# Patient Record
Sex: MALE | Race: WHITE | NOT HISPANIC OR LATINO | Employment: FULL TIME | URBAN - METROPOLITAN AREA
[De-identification: names, ages, dates, MRNs, and addresses within clinical notes are randomized per-mention and may not be internally consistent; named-entity substitution may affect disease eponyms.]

---

## 2018-08-10 ENCOUNTER — HOSPITAL ENCOUNTER (EMERGENCY)
Facility: HOSPITAL | Age: 23
Discharge: HOME | End: 2018-08-10
Attending: EMERGENCY MEDICINE
Payer: COMMERCIAL

## 2018-08-10 VITALS
SYSTOLIC BLOOD PRESSURE: 150 MMHG | HEART RATE: 109 BPM | WEIGHT: 225.06 LBS | RESPIRATION RATE: 18 BRPM | BODY MASS INDEX: 32.22 KG/M2 | DIASTOLIC BLOOD PRESSURE: 82 MMHG | TEMPERATURE: 97.3 F | HEIGHT: 70 IN | OXYGEN SATURATION: 98 %

## 2018-08-10 DIAGNOSIS — I10 HYPERTENSION: Primary | ICD-10-CM

## 2018-08-10 DIAGNOSIS — K21.9 GERD (GASTROESOPHAGEAL REFLUX DISEASE): ICD-10-CM

## 2018-08-10 DIAGNOSIS — Z76.0 ENCOUNTER FOR MEDICATION REFILL: ICD-10-CM

## 2018-08-10 PROCEDURE — 99281 EMR DPT VST MAYX REQ PHY/QHP: CPT

## 2018-08-10 RX ORDER — QUETIAPINE FUMARATE 25 MG/1
25 TABLET, FILM COATED ORAL 3 TIMES DAILY
Qty: 84 TABLET | Refills: 0 | Status: SHIPPED | OUTPATIENT
Start: 2018-08-10 | End: 2018-08-10

## 2018-08-10 RX ORDER — PANTOPRAZOLE SODIUM 40 MG/1
40 TABLET, DELAYED RELEASE ORAL DAILY
COMMUNITY
End: 2018-08-10

## 2018-08-10 RX ORDER — LOSARTAN POTASSIUM 50 MG/1
25 TABLET ORAL DAILY
COMMUNITY
End: 2018-08-10

## 2018-08-10 RX ORDER — QUETIAPINE FUMARATE 200 MG/1
25 TABLET, FILM COATED ORAL 3 TIMES DAILY
COMMUNITY
End: 2018-08-10

## 2018-08-10 RX ORDER — AMITRIPTYLINE HYDROCHLORIDE 75 MG/1
100 TABLET, FILM COATED ORAL 3 TIMES DAILY
COMMUNITY
End: 2018-08-10

## 2018-08-10 RX ORDER — GABAPENTIN 800 MG/1
800 TABLET ORAL 4 TIMES DAILY
Qty: 112 TABLET | Refills: 0 | Status: SHIPPED | OUTPATIENT
Start: 2018-08-10 | End: 2018-09-07

## 2018-08-10 RX ORDER — GABAPENTIN 800 MG/1
800 TABLET ORAL 4 TIMES DAILY
COMMUNITY
End: 2018-08-10

## 2018-08-10 RX ORDER — QUETIAPINE FUMARATE 400 MG/1
400 TABLET, FILM COATED ORAL
Qty: 28 TABLET | Refills: 0 | Status: SHIPPED | OUTPATIENT
Start: 2018-08-10 | End: 2018-09-07

## 2018-08-10 RX ORDER — PANTOPRAZOLE SODIUM 40 MG/1
40 TABLET, DELAYED RELEASE ORAL DAILY
Qty: 28 TABLET | Refills: 0 | Status: SHIPPED | OUTPATIENT
Start: 2018-08-10 | End: 2018-09-07

## 2018-08-10 RX ORDER — SUCRALFATE 1 G/1
1 TABLET ORAL 4 TIMES DAILY
COMMUNITY
End: 2018-08-10

## 2018-08-10 RX ORDER — NICOTINE 21 MG/24HR
1 PATCH, TRANSDERMAL 24 HOURS TRANSDERMAL EVERY 24 HOURS
Qty: 28 PATCH | Refills: 0 | Status: SHIPPED | OUTPATIENT
Start: 2018-08-10

## 2018-08-10 RX ORDER — SERTRALINE HYDROCHLORIDE 100 MG/1
100 TABLET, FILM COATED ORAL DAILY
COMMUNITY
End: 2018-08-10

## 2018-08-10 RX ORDER — SUCRALFATE 1 G/1
1 TABLET ORAL 4 TIMES DAILY
Qty: 112 TABLET | Refills: 0 | Status: SHIPPED | OUTPATIENT
Start: 2018-08-10 | End: 2018-09-07

## 2018-08-10 RX ORDER — PRAZOSIN HYDROCHLORIDE 1 MG/1
1 CAPSULE ORAL
COMMUNITY
End: 2018-08-10

## 2018-08-10 RX ORDER — QUETIAPINE FUMARATE 400 MG/1
400 TABLET, FILM COATED ORAL
COMMUNITY
End: 2018-08-10

## 2018-08-10 RX ORDER — PRAZOSIN HYDROCHLORIDE 1 MG/1
1 CAPSULE ORAL
Qty: 28 CAPSULE | Refills: 0 | Status: SHIPPED | OUTPATIENT
Start: 2018-08-10 | End: 2018-09-07

## 2018-08-10 RX ORDER — LOSARTAN POTASSIUM 25 MG/1
25 TABLET ORAL DAILY
Qty: 28 TABLET | Refills: 0 | Status: SHIPPED | OUTPATIENT
Start: 2018-08-10 | End: 2018-09-07

## 2018-08-10 RX ORDER — QUETIAPINE FUMARATE 200 MG/1
200 TABLET, FILM COATED ORAL 3 TIMES DAILY
Qty: 84 TABLET | Refills: 0 | Status: SHIPPED | OUTPATIENT
Start: 2018-08-10 | End: 2018-09-07

## 2018-08-10 RX ORDER — AMITRIPTYLINE HYDROCHLORIDE 100 MG/1
100 TABLET, FILM COATED ORAL 3 TIMES DAILY
Qty: 84 TABLET | Refills: 0 | Status: SHIPPED | OUTPATIENT
Start: 2018-08-10 | End: 2018-09-26

## 2018-08-10 RX ORDER — SERTRALINE HYDROCHLORIDE 100 MG/1
100 TABLET, FILM COATED ORAL DAILY
Qty: 28 TABLET | Refills: 0 | Status: SHIPPED | OUTPATIENT
Start: 2018-08-10 | End: 2018-09-07

## 2018-08-10 RX ORDER — NICOTINE 21 MG/24HR
1 PATCH, TRANSDERMAL 24 HOURS TRANSDERMAL EVERY 24 HOURS
COMMUNITY
End: 2018-08-10

## 2018-08-10 NOTE — DISCHARGE INSTRUCTIONS
Gastroesophageal Reflux Disease   WHAT YOU NEED TO KNOW:   Gastroesophageal reflux occurs when acid and food in the stomach back up into the esophagus  Gastroesophageal reflux disease (GERD) is reflux that occurs more than twice a week for a few weeks  It usually causes heartburn and other symptoms  GERD can cause other health problems over time if it is not treated  DISCHARGE INSTRUCTIONS:   Return to the emergency department if:   · You feel full and cannot burp or vomit  · You have severe chest pain and sudden trouble breathing  · Your bowel movements are black, bloody, or tarry-looking  · Your vomit looks like coffee grounds or has blood in it  Contact your healthcare provider if:   · You vomit large amounts, or you vomit often  · You have trouble breathing after you vomit  · You have trouble swallowing, or pain with swallowing  · You are losing weight without trying  · Your symptoms get worse or do not improve with treatment  · You have questions or concerns about your condition or care  Medicines:   · Medicines  are used to decrease stomach acid  Medicine may also be used to help your lower esophageal sphincter and stomach contract (tighten) more  · Take your medicine as directed  Contact your healthcare provider if you think your medicine is not helping or if you have side effects  Tell him of her if you are allergic to any medicine  Keep a list of the medicines, vitamins, and herbs you take  Include the amounts, and when and why you take them  Bring the list or the pill bottles to follow-up visits  Carry your medicine list with you in case of an emergency  Manage GERD:   · Do not have foods or drinks that may increase heartburn  These include chocolate, peppermint, fried or fatty foods, drinks that contain caffeine, or carbonated drinks (soda)  Other foods include spicy foods, onions, tomatoes, and tomato-based foods   Do not have foods or drinks that can irritate your esophagus, such as citrus fruits, juices, and alcohol  · Do not eat large meals  When you eat a lot of food at one time, your stomach needs more acid to digest it  Eat 6 small meals each day instead of 3 large ones, and eat slowly  Do not eat meals 2 to 3 hours before bedtime  · Elevate the head of your bed  Place 6-inch blocks under the head of your bed frame  You may also use more than one pillow under your head and shoulders while you sleep  · Maintain a healthy weight  If you are overweight, weight loss may help relieve symptoms of GERD  · Do not smoke  Smoking weakens the lower esophageal sphincter and increases the risk of GERD  Ask your healthcare provider for information if you currently smoke and need help to quit  E-cigarettes or smokeless tobacco still contain nicotine  Talk to your healthcare provider before you use these products  · Do not wear clothing that is tight around your waist   Tight clothing can put pressure on your stomach and cause or worsen GERD symptoms  Follow up with your healthcare provider as directed:  Write down your questions so you remember to ask them during your visits  © 2017 [facility] [facility] Information is for End User's use only and may not be sold, redistributed or otherwise used for commercial purposes  All illustrations and images included in CareNotes® are the copyrighted property of [employer]  or Freddie Riley  The above information is an  only  It is not intended as medical advice for individual conditions or treatments  Talk to your doctor, nurse or pharmacist before following any medical regimen to see if it is safe and effective for you

## 2018-08-10 NOTE — ED PROVIDER NOTES
History  Chief Complaint   Patient presents with    Medication Refill     Patient reports that he is a [place] resident with insurance, recently released from [facility]  states that "no one takes my insurance and I need prescriptions for my medicine " patient states that he has approximately 3 days of meds left  24 yo male w/ hx of opiate and EtOH abuse in remission, HTN, GERD and psychiatric disorder presents to the Emergency Department requesting a medication refill  He is new to the area and states that his current insurance coverage does not cover a local PMD; he will change insurance on 9/1/18 and has a pending appointment already set up through his   He has no acute complaints  Still has three days of medication remaining  No dosage changes within past 6 months  Prior to Admission Medications   Prescriptions Last Dose Informant Patient Reported? Taking?    QUEtiapine (SEROquel) 200 mg tablet   Yes Yes   Sig: Take 25 mg by mouth 3 (three) times a day   QUEtiapine (SEROquel) 400 MG tablet   Yes Yes   Sig: Take 400 mg by mouth daily at bedtime   amitriptyline (ELAVIL) 75 mg tablet   Yes Yes   Sig: Take 100 mg by mouth 3 (three) times a day   gabapentin (NEURONTIN) 800 mg tablet   Yes Yes   Sig: Take 800 mg by mouth 4 (four) times a day   losartan (COZAAR) 50 mg tablet   Yes Yes   Sig: Take 25 mg by mouth daily   nicotine (NICODERM CQ) 14 mg/24hr TD 24 hr patch   Yes Yes   Sig: Place 1 patch on the skin every 24 hours   pantoprazole (PROTONIX) 40 mg tablet   Yes Yes   Sig: Take 40 mg by mouth daily   prazosin (MINIPRESS) 1 mg capsule   Yes Yes   Sig: Take 1 mg by mouth daily at bedtime   sertraline (ZOLOFT) 100 mg tablet   Yes Yes   Sig: Take 100 mg by mouth daily   sertraline (ZOLOFT) 50 mg tablet   Yes Yes   Sig: Take 50 mg by mouth daily   sucralfate (CARAFATE) 1 g tablet   Yes Yes   Sig: Take 1 g by mouth 4 (four) times a day      Facility-Administered Medications: None Past Medical History:   Diagnosis Date    Gastric ulcer     GERD (gastroesophageal reflux disease)     Hypertension     Psychiatric disorder        Past Surgical History:   Procedure Laterality Date    KNEE SURGERY      SHOULDER SURGERY         History reviewed  No pertinent family history  I have reviewed and agree with the history as documented  Social History   Substance Use Topics    Smoking status: Current Every Day Smoker     Packs/day: 0 50    Smokeless tobacco: Never Used    Alcohol use Yes      Comment: in recovery         Review of Systems   Constitutional: Negative for fever  Respiratory: Negative for shortness of breath  Cardiovascular: Negative for chest pain  Gastrointestinal: Negative for abdominal pain and vomiting  Allergic/Immunologic: Negative for immunocompromised state  Psychiatric/Behavioral: Negative for behavioral problems, confusion, hallucinations, self-injury, sleep disturbance and suicidal ideas  The patient is not nervous/anxious  Physical Exam  Physical Exam   Constitutional: He is oriented to person, place, and time  He appears well-developed and well-nourished  No distress  HENT:   Head: Normocephalic and atraumatic  Eyes: Pupils are equal, round, and reactive to light  No scleral icterus  Neck: No JVD present  Cardiovascular: Normal rate and regular rhythm  Exam reveals no gallop and no friction rub  No murmur heard  Pulmonary/Chest: No respiratory distress  He has no wheezes  He has no rales  Neurological: He is alert and oriented to person, place, and time  Skin: Skin is warm and dry  Capillary refill takes less than 2 seconds  He is not diaphoretic  Psychiatric: He has a normal mood and affect  His behavior is normal  Thought content normal    Vitals reviewed        Vital Signs  ED Triage Vitals [08/10/18 1127]   Temperature Pulse Respirations Blood Pressure SpO2   (!) 97 3 °F (36 3 °C) (!) 109 18 150/82 98 %      Temp Source Heart Rate Source Patient Position - Orthostatic VS BP Location FiO2 (%)   Temporal Monitor Sitting Right arm --      Pain Score       No Pain           Vitals:    08/10/18 1127   BP: 150/82   Pulse: (!) 109   Patient Position - Orthostatic VS: Sitting       Visual Acuity      ED Medications  Medications - No data to display    Diagnostic Studies  Results Reviewed     None                 No orders to display              Procedures  Procedures       Phone Contacts  ED Phone Contact    ED Course                               MDM  Number of Diagnoses or Management Options  Encounter for medication refill: minor  GERD (gastroesophageal reflux disease): Hypertension:   Diagnosis management comments: 24 yo male presents for med refill  He has upcoming PCP appt within 1 month  None of his daily meds are controlled  He has no acute complaints today  4 week med refill provided       Amount and/or Complexity of Data Reviewed  Review and summarize past medical records: yes      CritCare Time    Disposition  Final diagnoses:   Hypertension   GERD (gastroesophageal reflux disease)   Encounter for medication refill     Time reflects when diagnosis was documented in both MDM as applicable and the Disposition within this note     Time User Action Codes Description Comment    8/10/2018 11:35 AM Bettey Bearded Add [I10] Hypertension     8/10/2018 11:35 AM Bettey Bearded Add [K21 9] GERD (gastroesophageal reflux disease)     8/10/2018 11:35 AM Bettey Bearded Add [Z76 0] Encounter for medication refill       ED Disposition     ED Disposition Condition Comment    Discharge  Jaida Cedeno discharge to home/self care      Condition at discharge: Stable        Follow-up Information     Follow up With Specialties Details Roger Lackey DO Internal Medicine   [address] [facility]  [address] [place] [address]  [phone]            Patient's Medications   Discharge Prescriptions    No medications on file     No discharge procedures on file      ED Provider  Electronically Signed by           Sascha Gonzalez PA-C  08/10/18 1144

## 2018-09-16 ENCOUNTER — HOSPITAL ENCOUNTER (EMERGENCY)
Facility: HOSPITAL | Age: 23
Discharge: HOME | End: 2018-09-16
Attending: EMERGENCY MEDICINE
Payer: OTHER MISCELLANEOUS

## 2018-09-16 ENCOUNTER — APPOINTMENT (EMERGENCY)
Dept: RADIOLOGY | Facility: HOSPITAL | Age: 23
End: 2018-09-16
Payer: OTHER MISCELLANEOUS

## 2018-09-16 VITALS
WEIGHT: 225 LBS | OXYGEN SATURATION: 98 % | HEART RATE: 102 BPM | BODY MASS INDEX: 32.21 KG/M2 | HEIGHT: 70 IN | SYSTOLIC BLOOD PRESSURE: 138 MMHG | TEMPERATURE: 99.1 F | RESPIRATION RATE: 20 BRPM | DIASTOLIC BLOOD PRESSURE: 80 MMHG

## 2018-09-16 DIAGNOSIS — S83.91XA SPRAIN OF RIGHT KNEE: Primary | ICD-10-CM

## 2018-09-16 PROCEDURE — 73564 X-RAY EXAM KNEE 4 OR MORE: CPT

## 2018-09-16 PROCEDURE — 99283 EMERGENCY DEPT VISIT LOW MDM: CPT

## 2018-09-16 RX ORDER — IBUPROFEN 600 MG/1
600 TABLET ORAL ONCE
Status: COMPLETED | OUTPATIENT
Start: 2018-09-16 | End: 2018-09-16

## 2018-09-16 RX ADMIN — IBUPROFEN 600 MG: 600 TABLET, FILM COATED ORAL at 14:01

## 2018-09-16 NOTE — ED PROVIDER NOTES
History  Chief Complaint   Patient presents with    Knee Injury     Presents with C/O pain rt knee since "heard a pop" while pushing a pallet cart at work at [employer] at [employer]  PMH torn ACL, MCL, 4 rt knee surgeries in past      Patient presents to the ED with right knee pain that started at work at [employer]   Patient was pushing a pallet and states he got it stuck in a ditch in the floor and when he tried to push it he felt a pop in his right knee and has had pain since then  Patient was wearing a compression sleeve upon arrival   He states in 2013 he had ACL, PCP, MCL, LCL ligament repair in [place], [place]  History provided by:  Patient  Knee Pain   Location:  Knee  Time since incident:  10 hours  Injury: yes    Mechanism of injury comment:  Pain in knee while pushing a pallet at work  Knee location:  R knee  Pain details:     Quality:  Aching    Radiates to:  Does not radiate    Severity:  Moderate    Onset quality:  Sudden    Duration:  10 hours    Timing:  Constant    Progression:  Unchanged  Chronicity:  New  Dislocation: no    Prior injury to area:  Yes (surgery to acl, pcl in 2013)  Relieved by:  Rest  Worsened by:  Bearing weight  Ineffective treatments:  None tried  Associated symptoms: swelling    Associated symptoms: no decreased ROM, no fever and no numbness        Prior to Admission Medications   Prescriptions Last Dose Informant Patient Reported? Taking?    QUEtiapine (SEROquel) 200 mg tablet   No No   Sig: Take 1 tablet (200 mg total) by mouth 3 (three) times a day for 28 days   QUEtiapine (SEROquel) 400 MG tablet   No No   Sig: Take 1 tablet (400 mg total) by mouth daily at bedtime for 28 days   amitriptyline (ELAVIL) 100 mg tablet   No No   Sig: Take 1 tablet (100 mg total) by mouth 3 (three) times a day for 28 days   gabapentin (NEURONTIN) 800 mg tablet   No No   Sig: Take 1 tablet (800 mg total) by mouth 4 (four) times a day for 28 days   losartan (COZAAR) 25 mg tablet   No No   Sig: Take 1 tablet (25 mg total) by mouth daily for 28 days   nicotine (NICODERM CQ) 14 mg/24hr TD 24 hr patch   No No   Sig: Place 1 patch on the skin every 24 hours   pantoprazole (PROTONIX) 40 mg tablet   No No   Sig: Take 1 tablet (40 mg total) by mouth daily for 28 days   prazosin (MINIPRESS) 1 mg capsule   No No   Sig: Take 1 capsule (1 mg total) by mouth daily at bedtime for 28 days   sertraline (ZOLOFT) 100 mg tablet   No No   Sig: Take 1 tablet (100 mg total) by mouth daily for 28 days   sertraline (ZOLOFT) 50 mg tablet   No No   Sig: Take 1 tablet (50 mg total) by mouth daily for 28 days   sucralfate (CARAFATE) 1 g tablet   No No   Sig: Take 1 tablet (1 g total) by mouth 4 (four) times a day for 28 days      Facility-Administered Medications: None       Past Medical History:   Diagnosis Date    Gastric ulcer     GERD (gastroesophageal reflux disease)     Hypertension     Psychiatric disorder        Past Surgical History:   Procedure Laterality Date    KNEE SURGERY      SHOULDER SURGERY         History reviewed  No pertinent family history  I have reviewed and agree with the history as documented  Social History   Substance Use Topics    Smoking status: Current Every Day Smoker     Packs/day: 0 50    Smokeless tobacco: Current User     Types: Chew    Alcohol use Yes      Comment: in recovery         Review of Systems   Constitutional: Negative for chills and fever  Musculoskeletal:        Right knee pain   Skin: Negative for color change  Neurological: Negative for weakness and numbness  All other systems reviewed and are negative  Physical Exam  Physical Exam   Constitutional: He is oriented to person, place, and time  He appears well-developed and well-nourished  HENT:   Head: Normocephalic and atraumatic  Eyes: Conjunctivae are normal    Neck: Normal range of motion  Cardiovascular: Normal rate  Pulses:       Dorsalis pedis pulses are 2+ on the right side     Pulmonary/Chest: Effort normal    Musculoskeletal:        Right knee: He exhibits swelling  He exhibits normal range of motion, no effusion, no ecchymosis, no deformity, no laceration, no erythema, normal alignment, no LCL laxity, normal patellar mobility, no bony tenderness, normal meniscus and no MCL laxity  Tenderness found  Medial joint line and lateral joint line tenderness noted  Neurological: He is alert and oriented to person, place, and time  He has normal strength  No sensory deficit  Gait normal    Skin: Skin is warm and dry  No abrasion, no bruising, no ecchymosis and no rash noted  He is not diaphoretic  No erythema  No pallor  Psychiatric: He has a normal mood and affect  Nursing note and vitals reviewed  Vital Signs  ED Triage Vitals [09/16/18 1351]   Temperature Pulse Respirations Blood Pressure SpO2   99 1 °F (37 3 °C) (!) 137 20 145/86 99 %      Temp Source Heart Rate Source Patient Position - Orthostatic VS BP Location FiO2 (%)   Temporal Monitor Sitting Left arm --      Pain Score       7           Vitals:    09/16/18 1351   BP: 145/86   Pulse: (!) 137   Patient Position - Orthostatic VS: Sitting       Visual Acuity      ED Medications  Medications   ibuprofen (MOTRIN) tablet 600 mg (600 mg Oral Given 9/16/18 1401)       Diagnostic Studies  Results Reviewed     None                 XR knee 4+ views Right injury   ED Interpretation by Kayce Jara PA-C (09/16 1407)   No acute abnormalities  Procedures  Procedures       Phone Contacts  ED Phone Contact    ED Course                               MDM  Number of Diagnoses or Management Options  Sprain of right knee: new and requires workup  Diagnosis management comments: Patient with right knee pain after pushing pallet, concern for ligament damage, will refer to ortho for recheck          Amount and/or Complexity of Data Reviewed  Tests in the radiology section of CPT®: ordered and reviewed  Independent visualization of images, tracings, or specimens: yes    Patient Progress  Patient progress: stable    CritCare Time    Disposition  Final diagnoses:   Sprain of right knee     Time reflects when diagnosis was documented in both MDM as applicable and the Disposition within this note     Time User Action Codes Description Comment    9/16/2018  2:10 PM Toñito Phoenix S Romeo Noel Sprain of right knee       ED Disposition     ED Disposition Condition Comment    Discharge  Mylesroland CaryDedham discharge to home/self care  Condition at discharge: Stable        Follow-up Information     Follow up With Specialties Details Why Contact Info    Sterling Floyd MD Orthopedic Surgery Call in 1 day For recheck [address]   [address] [phone]            Patient's Medications   Discharge Prescriptions    No medications on file     No discharge procedures on file      ED Provider  Electronically Signed by           Hoang Li PA-C  09/16/18 2990

## 2018-09-16 NOTE — ED NOTES
Knee immobilizer applied rt knee, distal neurovasc intact, pt familiar with immobilizers  Pt to follow-up with [facility] tomorrow am, has contact numbers        Duane Rincon RN  09/16/18 7211

## 2018-09-16 NOTE — DISCHARGE INSTRUCTIONS
Rest, ice, elevate leg  Motrin 600mg every 6 hours as needed for pain  Call ortho tomorrow for recheck  Knee Sprain   WHAT YOU NEED TO KNOW:   A knee sprain occurs when one or more ligaments in your knee are suddenly stretched or torn  Ligaments are tissues that hold bones together  Ligaments support the knee and keep the joint and bones in the correct position  DISCHARGE INSTRUCTIONS:   Return to the emergency department if:   · Any part of your leg feels cold, numb, or looks pale     Contact your healthcare provider if:   · You have new or increased swelling, bruising, or pain in your knee  · Your symptoms do not improve within 6 weeks, even with treatment  · You have questions or concerns about your condition or care  Medicines:   · NSAIDs , such as ibuprofen, help decrease swelling, pain, and fever  This medicine is available with or without a doctor's order  NSAIDs can cause stomach bleeding or kidney problems in certain people  If you take blood thinner medicine, always ask your healthcare provider if NSAIDs are safe for you  Always read the medicine label and follow directions  · Acetaminophen  decreases pain and fever  It is available without a doctor's order  Ask how much to take and how often to take it  Follow directions  Read the labels of all other medicines you are using to see if they also contain acetaminophen, or ask your doctor or pharmacist  Acetaminophen can cause liver damage if not taken correctly  Do not use more than 4 grams (4,000 milligrams) total of acetaminophen in one day  · Prescription pain medicine  may be given  Ask how to take this medicine safely  · Take your medicine as directed  Contact your healthcare provider if you think your medicine is not helping or if you have side effects  Tell him or her if you are allergic to any medicine  Keep a list of the medicines, vitamins, and herbs you take   Include the amounts, and when and why you take them  Bring the list or the pill bottles to follow-up visits  Carry your medicine list with you in case of an emergency  Self-care:   · Rest  your knee and do not exercise  You may be told to keep weight off your knee  This means that you should not walk on your injured leg  Rest helps decrease swelling and allows the injury to heal  You can do gentle range of motion (ROM) exercises as directed  This will prevent stiffness  · Apply ice  on your knee for 15 to 20 minutes every hour or as directed  Use an ice pack, or put crushed ice in a plastic bag  Cover it with a towel  Ice helps prevent tissue damage and decreases swelling and pain  · Apply compression to your knee as directed  You may need to wear an elastic bandage  This helps keep your injured knee from moving too much while it heals  You can loosen or tighten the elastic bandage to make it comfortable  It should be tight enough for you to feel support  It should not be so tight that it causes your toes to feel numb or tingly  If you are wearing an elastic bandage, take it off and rewrap it once a day  · Elevate your knee  above the level of your heart as often as you can  This will help decrease swelling and pain  Prop your leg on pillows or blankets to keep it elevated comfortably  Do not put pillows directly behind your knee  · Use support devices as directed:  Support devices such as a splint or brace may be needed  These devices limit movement and protect your joint while it heals  You may be given crutches to use until you can stand on your injured leg without pain  Use devices as directed  Physical therapy:  A physical therapist teaches you exercises to help improve movement and strength, and to decrease pain  Prevent another knee sprain:  Exercise your legs to keep your muscles strong  Strong leg muscles help protect your knee and prevent strain   The following may also prevent a knee sprain:  · Slowly start your exercise or training program   Slowly increase the time, distance, and intensity of your exercise  Sudden increases in training may cause you to injure your knee again  · Wear protective braces and equipment as directed  Braces may prevent your knee from moving the wrong way and causing another sprain  Protective equipment may support your bones and ligaments to prevent injury  · Warm up and stretch before exercise  Warm up by walking or using an exercise bike before starting your regular exercise  Do gentle stretches after warming up  This helps to loosen your muscles and decrease stress on your knee  Cool down and stretch after you exercise  · Wear shoes that fit correctly and support your feet  Replace your running or exercise shoes before the padding or shock absorption is worn out  Ask your healthcare provider which exercise shoes are best for you  Ask if you should wear special shoe inserts  Shoe inserts can help support your heels and arches or keep your foot lined up correctly in your shoes  Exercise on flat surfaces  Follow up with your healthcare provider as directed:  Write down your questions so you remember to ask them during your visits  © 2017 [facility] Information is for End User's use only and may not be sold, redistributed or otherwise used for commercial purposes  All illustrations and images included in CareNotes® are the copyrighted property of [employer]  or Freddie Riley  The above information is an  only  It is not intended as medical advice for individual conditions or treatments  Talk to your doctor, nurse or pharmacist before following any medical regimen to see if it is safe and effective for you

## 2018-09-26 VITALS
SYSTOLIC BLOOD PRESSURE: 144 MMHG | HEIGHT: 70 IN | DIASTOLIC BLOOD PRESSURE: 92 MMHG | WEIGHT: 234 LBS | BODY MASS INDEX: 33.5 KG/M2 | HEART RATE: 84 BPM

## 2018-09-26 DIAGNOSIS — S83.411A COMPLETE TEAR OF MCL OF KNEE, RIGHT, INITIAL ENCOUNTER: Primary | ICD-10-CM

## 2018-09-26 PROCEDURE — 99203 OFFICE O/P NEW LOW 30 MIN: CPT | Performed by: ORTHOPAEDIC SURGERY

## 2018-09-26 RX ORDER — LIDOCAINE AND PRILOCAINE 25; 25 MG/G; MG/G
CREAM TOPICAL
Refills: 0 | COMMUNITY
Start: 2018-09-17

## 2018-09-26 RX ORDER — GABAPENTIN 800 MG/1
800 TABLET ORAL 4 TIMES DAILY
Refills: 0 | COMMUNITY
Start: 2018-09-17

## 2018-09-26 RX ORDER — LOSARTAN POTASSIUM 25 MG/1
25 TABLET ORAL DAILY
Refills: 0 | COMMUNITY
Start: 2018-09-17

## 2018-09-26 RX ORDER — QUETIAPINE FUMARATE 200 MG/1
200 TABLET, FILM COATED ORAL 3 TIMES DAILY
Refills: 0 | COMMUNITY
Start: 2018-09-17

## 2018-09-26 RX ORDER — PRAZOSIN HYDROCHLORIDE 1 MG/1
1 CAPSULE ORAL DAILY
Refills: 0 | COMMUNITY
Start: 2018-09-17

## 2018-09-26 RX ORDER — QUETIAPINE FUMARATE 400 MG/1
400 TABLET, FILM COATED ORAL
Refills: 0 | COMMUNITY
Start: 2018-09-17

## 2018-09-26 RX ORDER — SERTRALINE HYDROCHLORIDE 100 MG/1
100 TABLET, FILM COATED ORAL DAILY
Refills: 0 | COMMUNITY
Start: 2018-09-17

## 2018-09-26 RX ORDER — NALOXONE HYDROCHLORIDE 4 MG/.1ML
SPRAY NASAL
Refills: 0 | COMMUNITY
Start: 2018-07-16

## 2018-09-26 RX ORDER — SUCRALFATE 1 G/1
1 TABLET ORAL 4 TIMES DAILY
Refills: 0 | COMMUNITY
Start: 2018-09-17

## 2018-09-26 RX ORDER — AMITRIPTYLINE HYDROCHLORIDE 100 MG/1
400 TABLET, FILM COATED ORAL DAILY
Refills: 0 | COMMUNITY
Start: 2018-09-18

## 2018-09-26 RX ORDER — PANTOPRAZOLE SODIUM 40 MG/1
40 TABLET, DELAYED RELEASE ORAL DAILY
Refills: 0 | COMMUNITY
Start: 2018-09-17

## 2018-09-26 RX ORDER — INFLUENZA A VIRUS A/SINGAPORE/GP1908/2015 IVR-180A (H1N1) ANTIGEN (PROPIOLACTONE INACTIVATED), INFLUENZA A VIRUS A/HONG KONG/4801/2014 X-263B (H3N2) ANTIGEN (PROPIOLACTONE INACTIVATED), INFLUENZA B VIRUS B/BRISBANE/46/2015 ANTIGEN (PROPIOLACTONE INACTIVATED), AND INFLUENZA B VIRUS B/PHUKET/3073/2013 BVR-1B ANTIGEN (PROPIOLACTONE INACTIVATED) 15; 15; 15; 15 UG/.5ML; UG/.5ML; UG/.5ML; UG/.5ML
INJECTION, SUSPENSION INTRAMUSCULAR
Refills: 0 | COMMUNITY
Start: 2018-09-17

## 2018-09-26 NOTE — ASSESSMENT & PLAN NOTE
Findings consistent with right knee MCL tear, possible ACL tear  Findings and treatment options were discussed with the patient  Recommend MRI of the right knee to further evaluate the joint  He was given a prescription for a long postop hinged knee brace- unlocked to use when ambulating  He is given work restrictions of limited walking, standing and no lifting greater than 10 pounds  Continue ice and elevation  Patient cannot take Tylenol or NSAIDs due to liver cirrhosis  Follow-up with MRI results and Dr Dejah Butterfield will make further treatment recommendations at that time  All questions were answered to patient's satisfaction

## 2018-09-26 NOTE — PROGRESS NOTES
Assessment:     1  Complete tear of MCL of knee, right, initial encounter        Plan:  The patient was seen and examined by Dr Shefali Huff and myself  Problem List Items Addressed This Visit        Musculoskeletal and Integument    Complete tear of MCL of knee, right, initial encounter - Primary     Findings consistent with right knee MCL tear, possible ACL tear  Findings and treatment options were discussed with the patient  Recommend MRI of the right knee to further evaluate the joint  He was given a prescription for a long postop hinged knee brace- unlocked to use when ambulating  He is given work restrictions of limited walking, standing and no lifting greater than 10 pounds  Continue ice and elevation  Patient cannot take Tylenol or NSAIDs due to liver cirrhosis  Follow-up with MRI results and Dr Shefali Huff will make further treatment recommendations at that time  All questions were answered to patient's satisfaction  Relevant Orders    MRI knee right  wo contrast    Durable Medical Equipment         Subjective:     Patient ID: Austin Solis is a 20 y o  male  Chief Complaint: This is a 52-year-old white male who suffered a work related injury to his right knee on September 16, 2018  Patient works at [employer] and he was pushing a cart in front of him  The cart got stuck when the ground transitioned from tile to concrete and patient twisted his right knee  He felt a pop in his right knee followed by pain and swelling  Since then he continues to have pain when ambulating  He has knee has given out on him when ambulating  He was seen the emergency room and given a knee immobilizer  No other treatment  He is working with restrictions  He has a history of 2 prior ACL reconstructions on that knee in 2013 and 2014 from football injuries  He states the were no complications and he did well with physical therapy afterwards  Patient intake form was reviewed today         Allergy:  Allergies   Allergen Reactions    Pine      Other reaction(s): Dermatologic Reaction     Medications:  all current active meds have been reviewed  Past Medical History:  Past Medical History:   Diagnosis Date    Gastric ulcer     GERD (gastroesophageal reflux disease)     Hypertension     Psychiatric disorder      Past Surgical History:  Past Surgical History:   Procedure Laterality Date    KNEE SURGERY Right 2016 & 2018    ACL    KNEE SURGERY Left 2017    ACL    SHOULDER SURGERY Right 2016    posterior labrum and rotator cuff repair     Family History:  Family History   Problem Relation Age of Onset    No Known Problems Mother     Heart disease Father     Hypertension Father      Social History:  History   Alcohol Use    Yes     Comment: in recovery      History   Drug Use     Comment: recovery      History   Smoking Status    Current Every Day Smoker    Packs/day: 0 50   Smokeless Tobacco    Current User    Types: Chew     Review of Systems   Constitutional: Positive for unexpected weight change  HENT: Negative  Eyes: Negative  Respiratory: Negative  Cardiovascular: Positive for leg swelling  Gastrointestinal: Positive for abdominal pain  Endocrine: Positive for polydipsia  Genitourinary: Positive for frequency  Musculoskeletal: Positive for arthralgias, joint swelling and myalgias  Skin: Negative  Allergic/Immunologic: Negative  Neurological: Negative  Hematological: Negative  Psychiatric/Behavioral: Positive for decreased concentration and dysphoric mood  The patient is nervous/anxious  Objective:  BP Readings from Last 1 Encounters:   09/26/18 144/92      Wt Readings from Last 1 Encounters:   09/26/18 106 kg (234 lb)      BMI:   Estimated body mass index is 33 58 kg/m² as calculated from the following:    Height as of this encounter: 5' 10" (1 778 m)  Weight as of this encounter: 106 kg (234 lb)    BSA:   Estimated body surface area is 2 23 meters squared as calculated from the following:    Height as of this encounter: 5' 10" (1 778 m)  Weight as of this encounter: 106 kg (234 lb)  Physical Exam   Constitutional: He is oriented to person, place, and time  He appears well-developed  HENT:   Head: Normocephalic and atraumatic  Eyes: Conjunctivae and EOM are normal    Neck: Neck supple  Musculoskeletal:        Right knee: He exhibits effusion (Grade 2)  Neurological: He is alert and oriented to person, place, and time  Skin: Skin is warm  Psychiatric: He has a normal mood and affect  Nursing note and vitals reviewed  Right Knee Exam     Tenderness   The patient is experiencing tenderness in the lateral joint line  Range of Motion   The patient has normal right knee ROM  Tests   Candi:  Medial - negative Lateral - positive  Lachman:  Anterior - positive      Drawer:       Anterior - positive    Posterior - negative  Varus: negative  Valgus: positive  Patellar Apprehension: negative    Other   Erythema: absent  Scars: present  Sensation: normal  Pulse: present  Swelling: moderate  Other tests: effusion (Grade 2) present    Comments:  3/5 quadriceps strength      Left Knee Exam     Range of Motion   The patient has normal left knee ROM  Muscle Strength     The patient has normal left knee strength  Tests   Lachman:  Anterior - negative      Drawer:       Anterior - negative         Other   Scars: present            I have personally reviewed pertinent films in PACS  X-rays of the right knee reveal evidence of prior ACL reconstruction  Joint spaces are well maintained  No acute fractures

## 2018-10-08 ENCOUNTER — HOSPITAL ENCOUNTER (OUTPATIENT)
Dept: MRI IMAGING | Facility: HOSPITAL | Age: 23
Discharge: HOME | End: 2018-10-08
Payer: OTHER MISCELLANEOUS

## 2018-10-08 DIAGNOSIS — S83.411A COMPLETE TEAR OF MCL OF KNEE, RIGHT, INITIAL ENCOUNTER: ICD-10-CM

## 2018-10-08 PROCEDURE — 73721 MRI JNT OF LWR EXTRE W/O DYE: CPT

## 2018-10-10 ENCOUNTER — TELEPHONE (OUTPATIENT)
Dept: OBGYN CLINIC | Facility: HOSPITAL | Age: 23
End: 2018-10-10

## 2018-10-10 NOTE — TELEPHONE ENCOUNTER
Caller: Patient  C/B #:   Dr Quita Syed    Pt had an appointment tomorrow but had to cancel  He currently has no means of transportation  He is asking if we can please call him with the MRI result    Thanks

## 2018-10-10 NOTE — TELEPHONE ENCOUNTER
According to MRI his ACL graft is intact  No collateral ligament injury  Possible medial meniscal tear, but difficult to say with his old injury  He will need to continue use of the hinged brace and reschedule his appointment, so I can discuss with him treatment options

## 2018-10-10 NOTE — TELEPHONE ENCOUNTER
Patient was called  Given message and appointment was made  He did request pain medication  I checked with Dr John Talbot and he is to use over the counter medication

## 2021-03-08 ENCOUNTER — OFFICE VISIT (OUTPATIENT)
Dept: URGENT CARE | Facility: CLINIC | Age: 26
End: 2021-03-08
Payer: COMMERCIAL

## 2021-03-08 VITALS
TEMPERATURE: 97.9 F | RESPIRATION RATE: 16 BRPM | HEIGHT: 70 IN | BODY MASS INDEX: 35.07 KG/M2 | WEIGHT: 245 LBS | OXYGEN SATURATION: 97 % | HEART RATE: 119 BPM

## 2021-03-08 DIAGNOSIS — R05.9 COUGH: ICD-10-CM

## 2021-03-08 DIAGNOSIS — R11.2 NAUSEA AND VOMITING, INTRACTABILITY OF VOMITING NOT SPECIFIED, UNSPECIFIED VOMITING TYPE: Primary | ICD-10-CM

## 2021-03-08 PROCEDURE — U0005 INFEC AGEN DETEC AMPLI PROBE: HCPCS | Performed by: PHYSICIAN ASSISTANT

## 2021-03-08 PROCEDURE — U0003 INFECTIOUS AGENT DETECTION BY NUCLEIC ACID (DNA OR RNA); SEVERE ACUTE RESPIRATORY SYNDROME CORONAVIRUS 2 (SARS-COV-2) (CORONAVIRUS DISEASE [COVID-19]), AMPLIFIED PROBE TECHNIQUE, MAKING USE OF HIGH THROUGHPUT TECHNOLOGIES AS DESCRIBED BY CMS-2020-01-R: HCPCS | Performed by: PHYSICIAN ASSISTANT

## 2021-03-08 PROCEDURE — 99213 OFFICE O/P EST LOW 20 MIN: CPT | Performed by: PHYSICIAN ASSISTANT

## 2021-03-08 RX ORDER — PROPRANOLOL HYDROCHLORIDE 10 MG/1
10 TABLET ORAL 3 TIMES DAILY
COMMUNITY

## 2021-03-08 NOTE — PROGRESS NOTES
NAME: Jolly Reyes is a 30 y o  male  : 1995    MRN: [id number]      Assessment and Plan   Nausea and vomiting, intractability of vomiting not specified, unspecified vomiting type [R11 2]  1  Nausea and vomiting, intractability of vomiting not specified, unspecified vomiting type  Novel Coron[facility]Covid-19),[facility]   2  Cough  Novel Coronavirus (Covid-19),PCR [facility] [place] - Office Collection         Thorough discussion with patient that he should go to the ER for further evaluation  He reports throwing up what appeared to be coffee-grounds which is consistent with blood  He has a history of a gastric ulcer and GERD  He is tachycardic here in the clinic which could be a sign of hypovolemia or internal bleeding  He states that he does not want to go to the ER  Offered to call patient and ambulance he refuses  Discussed with patient this could be a life threatening condition and despite feeling a little better, he could still have a problem internally  He acknowledges and refuses- states he "just wants a COVID test "  Aida Santana LPN present as witness for discussion and refusal  Patient swabbed for COVID and instructed to quarantine until results come back, OTC meds and monitor symptoms  If anything changes or worsens call 911 or go to the ER  He acknowledges and states if he is not feeling better tomorrow he will go then  Patient Instructions     Patient Instructions   COVID-19 Home Care Guidelines    Your healthcare provider and/or public health staff have evaluated you and have determined that you do not need to remain in the hospital at this time  At this time you can be isolated at home where you will be monitored by staff from your local or state health department  You should carefully follow the prevention and isolation steps below until a healthcare provider or local or state health department says that you can return to your normal activities        Stay home except to get medical care    People who are mildly ill with COVID-19 are able to isolate at home during their illness  You should restrict activities outside your home, except for getting medical care  Do not go to work, school, or public areas  Avoid using public transportation, ride-sharing, or taxis  Separate yourself from other people and animals in your home    People: As much as possible, you should stay in a specific room and away from other people in your home  Also, you should use a separate bathroom, if available  Animals: You should restrict contact with pets and other animals while you are sick with COVID-19, just like you would around other people  Although there have not been reports of pets or other animals becoming sick with COVID-19, it is still recommended that people sick with COVID-19 limit contact with animals until more information is known about the virus  When possible, have another member of your household care for your animals while you are sick  If you are sick with COVID-19, avoid contact with your pet, including petting, snuggling, being kissed or licked, and sharing food  If you must care for your pet or be around animals while you are sick, wash your hands before and after you interact with pets and wear a facemask  See COVID-19 and Animals for more information  Call ahead before visiting your doctor    If you have a medical appointment, call the healthcare provider and tell them that you have or may have COVID-19  This will help the healthcare providers office take steps to keep other people from getting infected or exposed  Wear a facemask    You should wear a facemask when you are around other people (e g , sharing a room or vehicle) or pets and before you enter a healthcare providers office   If you are not able to wear a facemask (for example, because it causes trouble breathing), then people who live with you should not stay in the same room with you, or they should wear a facemask if they enter your room  Cover your coughs and sneezes    Cover your mouth and nose with a tissue when you cough or sneeze  Throw used tissues in a lined trash can  Immediately wash your hands with soap and water for at least 20 seconds or, if soap and water are not available, clean your hands with an alcohol-based hand  that contains at least 60% alcohol  Clean your hands often    Wash your hands often with soap and water for at least 20 seconds, especially after blowing your nose, coughing, or sneezing; going to the bathroom; and before eating or preparing food  If soap and water are not readily available, use an alcohol-based hand  with at least 60% alcohol, covering all surfaces of your hands and rubbing them together until they feel dry  Soap and water are the best option if hands are visibly dirty  Avoid touching your eyes, nose, and mouth with unwashed hands  Avoid sharing personal household items    You should not share dishes, drinking glasses, cups, eating utensils, towels, or bedding with other people or pets in your home  After using these items, they should be washed thoroughly with soap and water  Clean all high-touch surfaces everyday    High touch surfaces include counters, tabletops, doorknobs, bathroom fixtures, toilets, phones, keyboards, tablets, and bedside tables  Also, clean any surfaces that may have blood, stool, or body fluids on them  Use a household cleaning spray or wipe, according to the label instructions  Labels contain instructions for safe and effective use of the cleaning product including precautions you should take when applying the product, such as wearing gloves and making sure you have good ventilation during use of the product  Monitor your symptoms    Seek prompt medical attention if your illness is worsening (e g , difficulty breathing)   Before seeking care, call your healthcare provider and tell them that you have, or are being evaluated for, COVID-19  Put on a facemask before you enter the facility  These steps will help the healthcare providers office to keep other people in the office or waiting room from getting infected or exposed  Ask your healthcare provider to call the local or [facility] health department  Persons who are placed under active monitoring or facilitated self-monitoring should follow instructions provided by their local health department or occupational health professionals, as appropriate  If you have a medical emergency and need to call 911, notify the dispatch personnel that you have, or are being evaluated for COVID-19  If possible, put on a facemask before emergency medical services arrive  Discontinuing home isolation    Patients with confirmed COVID-19 should remain under home isolation precautions until the following conditions are met:   - They have had no fever for at least 24 hours (that is one full day of no fever without the use medicine that reduces fevers)  AND  - other symptoms have improved (for example, when their cough or shortness of breath have improved)  AND  - If had mild or moderate illness, at least 10 days have passed since their symptoms first appeared or if severe illness (needed oxygen) or immunosuppressed, at least 20 days have passed since symptoms first appeared  Patients with confirmed COVID-19 should also notify close contacts (including their workplace) and ask that they self-quarantine  Currently, close contact is defined as being within 6 feet for 15 minutes or more from the period 24 hours starting 48 hours before symptom onset to the time at which the patient went into isolation  Close contacts of patients diagnosed with COVID-19 should be instructed by the patient to self-quarantine for 14 days from the last time of their last contact with the patient       Source: [employer]      Proceed to ER if symptoms worsen  Chief Complaint     Chief Complaint   Patient presents with    Cough     BEGAN ABOUT A WEEK AGO, PRODUCTIVE    Diarrhea     TODAY    Vomiting     BEGAN SATURDAY, LAST TIME HE VOMITED WAS YESTERDAY         History of Present Illness    Patient with history of GERD, hypertension, gastric ulcer and psychiatric disorder presents complaining of vomiting, cough and diarrhea x4 days  States for the past 3-4 days he has been having several episodes of loose stools along with nausea and vomiting  Reports last time he vomited was yesterday  Has been able to tolerate food and liquid since  States he has a cough which is nonproductive  Reports some chest tightness but denies any chest pain, palpitations, shortness of breath or dyspnea  He denies any fevers or chills  Reports some nasal congestion and rhinorrhea  Denies any loss of taste or smell  He admits that he had several episodes of vomiting that had "ground up coffee" appearance  He states he also had 1 or 2 episodes of  Bright red blood in his diarrhea  Denies any black or maroon stools  He denies any abdominal pain or back pain  Denies any dizziness or lightheadedness  He states he is here because he needs a COVID tests as he had exposure at work  Has not taken anything over-the-counter  States he is feeling better today than he has the past few days  Review of Systems   Review of Systems   Constitutional: Negative for chills and fever  HENT: Positive for congestion, rhinorrhea and sinus pressure  Respiratory: Positive for cough and chest tightness  Negative for shortness of breath, wheezing and stridor  Cardiovascular: Negative for chest pain and palpitations  Gastrointestinal: Positive for blood in stool, diarrhea, nausea and vomiting  Negative for abdominal pain  Musculoskeletal: Negative for back pain and myalgias  Neurological: Negative for dizziness, light-headedness and headaches           Current Medications Current Outpatient Medications:     gabapentin (NEURONTIN) 800 mg tablet, Take 800 mg by mouth 4 (four) times a day, Disp: , Rfl: 0    propranolol (INDERAL) 10 mg tablet, Take 10 mg by mouth 3 (three) times a day, Disp: , Rfl:     AFLURIA QUADRIVALENT 0 5 ML MARILEE, inject 0 5 milliliter intramuscularly, Disp: , Rfl: 0    amitriptyline (ELAVIL) 100 mg tablet, Take 400 mg by mouth daily, Disp: , Rfl: 0    lidocaine-prilocaine (EMLA) cream, APPLY 3 TIMES A DAY TOPICALLY AS DIRECTED, Disp: , Rfl: 0    losartan (COZAAR) 25 mg tablet, Take 25 mg by mouth daily, Disp: , Rfl: 0    NARCAN 4 MG/0 1ML LIQD, FOLLOW KIT DIRECTIONS, Disp: , Rfl: 0    nicotine (NICODERM CQ) 14 mg/24hr TD 24 hr patch, Place 1 patch on the skin every 24 hours (Patient not taking: Reported on 3/8/2021), Disp: 28 patch, Rfl: 0    pantoprazole (PROTONIX) 40 mg tablet, Take 40 mg by mouth daily, Disp: , Rfl: 0    prazosin (MINIPRESS) 1 mg capsule, Take 1 mg by mouth daily, Disp: , Rfl: 0    QUEtiapine (SEROquel) 200 mg tablet, Take 200 mg by mouth 3 (three) times a day, Disp: , Rfl: 0    QUEtiapine (SEROquel) 400 MG tablet, Take 400 mg by mouth daily at bedtime, Disp: , Rfl: 0    sertraline (ZOLOFT) 100 mg tablet, Take 100 mg by mouth daily, Disp: , Rfl: 0    sertraline (ZOLOFT) 50 mg tablet, Take 50 mg by mouth daily, Disp: , Rfl: 0    sucralfate (CARAFATE) 1 g tablet, Take 1 g by mouth 4 (four) times a day, Disp: , Rfl: 0    Current Allergies     Allergies as of 03/08/2021 - Reviewed 03/08/2021   Allergen Reaction Noted    [facility]  02/07/2016              Past Medical History:   Diagnosis Date    Gastric ulcer     GERD (gastroesophageal reflux disease)     Hypertension     Psychiatric disorder        Past Surgical History:   Procedure Laterality Date    KNEE SURGERY Right 2016 & 2018    ACL    KNEE SURGERY Left 2017    ACL    SHOULDER SURGERY Right 2016    posterior labrum and rotator cuff repair       Family History   Problem Relation Age of Onset    No Known Problems Mother     Heart disease Father     Hypertension Father          Medications have been verified  The following portions of the patient's history were reviewed and updated as appropriate: allergies, current medications, past family history, past medical history, past social history, past surgical history and problem list     Objective   Pulse (!) 119   Temp 97 9 °F (36 6 °C) (Tympanic)   Resp 16   Ht 5' 10" (1 778 m)   Wt 111 kg (245 lb)   SpO2 97%   BMI 35 15 kg/m²      Physical Exam     Physical Exam  Vitals signs and nursing note reviewed  Constitutional:       General: He is not in acute distress  Appearance: Normal appearance  He is not ill-appearing, toxic-appearing or diaphoretic  Cardiovascular:      Rate and Rhythm: Regular rhythm  Tachycardia present  Heart sounds: Normal heart sounds  Pulmonary:      Effort: Pulmonary effort is normal  No respiratory distress  Breath sounds: Normal breath sounds  No stridor  No wheezing, rhonchi or rales  Abdominal:      General: Abdomen is flat  There is no distension  Palpations: Abdomen is soft  There is no mass  Tenderness: There is no guarding or rebound  Comments: Patient reports "sensitivity"  Throughout all quadrants but denies any pain  No rigidity, rebound or guarding  Skin:     General: Skin is warm  Capillary Refill: Capillary refill takes less than 2 seconds  Neurological:      Mental Status: He is alert and oriented to person, place, and time  Psychiatric:         Mood and Affect: Mood normal          Thought Content:  Thought content normal

## 2021-03-08 NOTE — PATIENT INSTRUCTIONS

## 2021-03-09 LAB — SARS-COV-2 RNA RESP QL NAA+PROBE: NEGATIVE

## 2021-03-10 ENCOUNTER — TELEPHONE (OUTPATIENT)
Dept: URGENT CARE | Facility: CLINIC | Age: 26
End: 2021-03-10

## 2021-03-19 ENCOUNTER — APPOINTMENT (EMERGENCY)
Dept: CT IMAGING | Facility: HOSPITAL | Age: 26
End: 2021-03-19
Payer: COMMERCIAL

## 2021-03-19 ENCOUNTER — HOSPITAL ENCOUNTER (EMERGENCY)
Facility: HOSPITAL | Age: 26
Discharge: HOME | End: 2021-03-19
Attending: EMERGENCY MEDICINE | Admitting: EMERGENCY MEDICINE
Payer: COMMERCIAL

## 2021-03-19 VITALS
HEART RATE: 111 BPM | OXYGEN SATURATION: 97 % | TEMPERATURE: 98.2 F | SYSTOLIC BLOOD PRESSURE: 153 MMHG | RESPIRATION RATE: 20 BRPM | BODY MASS INDEX: 35.15 KG/M2 | WEIGHT: 245 LBS | DIASTOLIC BLOOD PRESSURE: 88 MMHG

## 2021-03-19 DIAGNOSIS — R31.9 HEMATURIA: ICD-10-CM

## 2021-03-19 DIAGNOSIS — R10.9 ABDOMINAL PAIN: ICD-10-CM

## 2021-03-19 DIAGNOSIS — R11.2 NAUSEA AND VOMITING: Primary | ICD-10-CM

## 2021-03-19 LAB
ALBUMIN SERPL BCP-MCNC: 4 G/DL (ref 3.5–5)
ALP SERPL-CCNC: 84 U/L (ref 46–116)
ALT SERPL W P-5'-P-CCNC: 67 U/L (ref 12–78)
ANION GAP SERPL CALCULATED.3IONS-SCNC: 13 MMOL/L (ref 4–13)
AST SERPL W P-5'-P-CCNC: 40 U/L (ref 5–45)
BACTERIA UR QL AUTO: ABNORMAL /HPF
BASOPHILS # BLD AUTO: 0.08 THOUSANDS/ΜL (ref 0–0.1)
BASOPHILS NFR BLD AUTO: 1 % (ref 0–1)
BILIRUB SERPL-MCNC: 0.5 MG/DL (ref 0.2–1)
BILIRUB UR QL STRIP: NEGATIVE
BUN SERPL-MCNC: 12 MG/DL (ref 5–25)
CALCIUM SERPL-MCNC: 9.2 MG/DL (ref 8.3–10.1)
CHLORIDE SERPL-SCNC: 101 MMOL/L (ref 100–108)
CLARITY UR: CLEAR
CO2 SERPL-SCNC: 26 MMOL/L (ref 21–32)
COLOR UR: YELLOW
CREAT SERPL-MCNC: 1.02 MG/DL (ref 0.6–1.3)
EOSINOPHIL # BLD AUTO: 0.06 THOUSAND/ΜL (ref 0–0.61)
EOSINOPHIL NFR BLD AUTO: 1 % (ref 0–6)
ERYTHROCYTE [DISTWIDTH] IN BLOOD BY AUTOMATED COUNT: 11.6 % (ref 11.6–15.1)
GFR SERPL CREATININE-BSD FRML MDRD: 100 ML/MIN/1.73SQ M
GLUCOSE SERPL-MCNC: 65 MG/DL (ref 65–140)
GLUCOSE UR STRIP-MCNC: NEGATIVE MG/DL
HCT VFR BLD AUTO: 46.7 % (ref 36.5–49.3)
HGB BLD-MCNC: 16.1 G/DL (ref 12–17)
HGB UR QL STRIP.AUTO: ABNORMAL
IMM GRANULOCYTES # BLD AUTO: 0.04 THOUSAND/UL (ref 0–0.2)
IMM GRANULOCYTES NFR BLD AUTO: 0 % (ref 0–2)
KETONES UR STRIP-MCNC: ABNORMAL MG/DL
LEUKOCYTE ESTERASE UR QL STRIP: NEGATIVE
LIPASE SERPL-CCNC: 50 U/L (ref 73–393)
LYMPHOCYTES # BLD AUTO: 2.63 THOUSANDS/ΜL (ref 0.6–4.47)
LYMPHOCYTES NFR BLD AUTO: 21 % (ref 14–44)
MCH RBC QN AUTO: 32.7 PG (ref 26.8–34.3)
MCHC RBC AUTO-ENTMCNC: 34.5 G/DL (ref 31.4–37.4)
MCV RBC AUTO: 95 FL (ref 82–98)
MONOCYTES # BLD AUTO: 1.15 THOUSAND/ΜL (ref 0.17–1.22)
MONOCYTES NFR BLD AUTO: 9 % (ref 4–12)
NEUTROPHILS # BLD AUTO: 8.41 THOUSANDS/ΜL (ref 1.85–7.62)
NEUTS SEG NFR BLD AUTO: 68 % (ref 43–75)
NITRITE UR QL STRIP: NEGATIVE
NON-SQ EPI CELLS URNS QL MICRO: ABNORMAL /HPF
NRBC BLD AUTO-RTO: 0 /100 WBCS
PH UR STRIP.AUTO: 5.5 [PH]
PLATELET # BLD AUTO: 333 THOUSANDS/UL (ref 149–390)
PMV BLD AUTO: 8.9 FL (ref 8.9–12.7)
POTASSIUM SERPL-SCNC: 3.3 MMOL/L (ref 3.5–5.3)
PROT SERPL-MCNC: 7.8 G/DL (ref 6.4–8.2)
PROT UR STRIP-MCNC: NEGATIVE MG/DL
RBC # BLD AUTO: 4.92 MILLION/UL (ref 3.88–5.62)
RBC #/AREA URNS AUTO: ABNORMAL /HPF
SODIUM SERPL-SCNC: 140 MMOL/L (ref 136–145)
SP GR UR STRIP.AUTO: >=1.03 (ref 1–1.03)
TSH SERPL DL<=0.05 MIU/L-ACNC: 2.32 UIU/ML (ref 0.36–3.74)
UROBILINOGEN UR QL STRIP.AUTO: 0.2 E.U./DL
WBC # BLD AUTO: 12.37 THOUSAND/UL (ref 4.31–10.16)
WBC #/AREA URNS AUTO: ABNORMAL /HPF

## 2021-03-19 PROCEDURE — 84443 ASSAY THYROID STIM HORMONE: CPT | Performed by: PHYSICIAN ASSISTANT

## 2021-03-19 PROCEDURE — 74177 CT ABD & PELVIS W/CONTRAST: CPT

## 2021-03-19 PROCEDURE — 99284 EMERGENCY DEPT VISIT MOD MDM: CPT | Performed by: PHYSICIAN ASSISTANT

## 2021-03-19 PROCEDURE — 93005 ELECTROCARDIOGRAM TRACING: CPT

## 2021-03-19 PROCEDURE — 36415 COLL VENOUS BLD VENIPUNCTURE: CPT | Performed by: PHYSICIAN ASSISTANT

## 2021-03-19 PROCEDURE — 85025 COMPLETE CBC W/AUTO DIFF WBC: CPT | Performed by: PHYSICIAN ASSISTANT

## 2021-03-19 PROCEDURE — 81001 URINALYSIS AUTO W/SCOPE: CPT | Performed by: PHYSICIAN ASSISTANT

## 2021-03-19 PROCEDURE — G1004 CDSM NDSC: HCPCS

## 2021-03-19 PROCEDURE — 99284 EMERGENCY DEPT VISIT MOD MDM: CPT

## 2021-03-19 PROCEDURE — 80053 COMPREHEN METABOLIC PANEL: CPT | Performed by: PHYSICIAN ASSISTANT

## 2021-03-19 PROCEDURE — 96360 HYDRATION IV INFUSION INIT: CPT

## 2021-03-19 PROCEDURE — 83690 ASSAY OF LIPASE: CPT | Performed by: PHYSICIAN ASSISTANT

## 2021-03-19 RX ORDER — POTASSIUM CHLORIDE 20 MEQ/1
40 TABLET, EXTENDED RELEASE ORAL ONCE
Status: COMPLETED | OUTPATIENT
Start: 2021-03-19 | End: 2021-03-19

## 2021-03-19 RX ORDER — ONDANSETRON 4 MG/1
4 TABLET, ORALLY DISINTEGRATING ORAL EVERY 6 HOURS PRN
Qty: 20 TABLET | Refills: 0 | Status: SHIPPED | OUTPATIENT
Start: 2021-03-19

## 2021-03-19 RX ADMIN — IOHEXOL 100 ML: 350 INJECTION, SOLUTION INTRAVENOUS at 15:36

## 2021-03-19 RX ADMIN — POTASSIUM CHLORIDE 40 MEQ: 1500 TABLET, EXTENDED RELEASE ORAL at 16:01

## 2021-03-19 RX ADMIN — SODIUM CHLORIDE 1000 ML: 0.9 INJECTION, SOLUTION INTRAVENOUS at 14:54

## 2021-03-19 NOTE — DISCHARGE INSTRUCTIONS
Rest, increase fluids  Tylenol as needed for pain  Follow up with urologist concerning blood in urine  FOllow up with GI doctor for abdominal pain and vomiting  Return to ER if symptoms worsen

## 2021-03-19 NOTE — ED PROVIDER NOTES
History  Chief Complaint   Patient presents with    Vomiting     To ED with c/o vomiting x 2 weeks and left sided pain  today  Denies any fever, chills  Has chronic soft stool and diarrhea  Patient is a 29 y/o M with h/o anxiety, GERD, HTN that presents to the ED with multiple chronic complaints that are worsening  He states he has had nausea and vomiting for 2 weeks  He states he only vomited 3 times in the past 2 weeks, but vomited 3 times today  He has had left sided abdominal pain "for awhile "  Patient unable to tell me how long  He is tachycardic, but patient states he is nervous  He states he has had diarrhea for over 1 year  Patient has not seen a doctor for this  He states nothing makes the pain worse, nothing makes it better  History provided by:  Patient  Vomiting  Severity:  Mild  Duration:  2 weeks  Timing:  Intermittent  Progression:  Worsening  Chronicity:  New  Relieved by:  Nothing  Worsened by:  Nothing  Ineffective treatments:  None tried  Associated symptoms: abdominal pain and diarrhea    Associated symptoms: no chills, no cough, no fever, no headaches, no myalgias, no sore throat and no URI    Risk factors: no sick contacts, no suspect food intake and no travel to endemic areas        Prior to Admission Medications   Prescriptions Last Dose Informant Patient Reported? Taking?    AFLURIA QUADRIVALENT 0 5 ML MARILEE  Self Yes No   Sig: inject 0 5 milliliter intramuscularly   NARCAN 4 MG/0 1ML LIQD  Self Yes No   Sig: FOLLOW KIT DIRECTIONS   QUEtiapine (SEROquel) 200 mg tablet  Self Yes No   Sig: Take 200 mg by mouth 3 (three) times a day   QUEtiapine (SEROquel) 400 MG tablet  Self Yes No   Sig: Take 400 mg by mouth daily at bedtime   amitriptyline (ELAVIL) 100 mg tablet  Self Yes No   Sig: Take 400 mg by mouth daily   gabapentin (NEURONTIN) 800 mg tablet  Self Yes No   Sig: Take 800 mg by mouth 4 (four) times a day   lidocaine-prilocaine (EMLA) cream  Self Yes No   Sig: APPLY 3 TIMES A DAY TOPICALLY AS DIRECTED   losartan (COZAAR) 25 mg tablet  Self Yes No   Sig: Take 25 mg by mouth daily   nicotine (NICODERM CQ) 14 mg/24hr TD 24 hr patch  Self No No   Sig: Place 1 patch on the skin every 24 hours   Patient not taking: Reported on 3/8/2021   pantoprazole (PROTONIX) 40 mg tablet  Self Yes No   Sig: Take 40 mg by mouth daily   prazosin (MINIPRESS) 1 mg capsule  Self Yes No   Sig: Take 1 mg by mouth daily   propranolol (INDERAL) 10 mg tablet   Yes No   Sig: Take 10 mg by mouth 3 (three) times a day   sertraline (ZOLOFT) 100 mg tablet  Self Yes No   Sig: Take 100 mg by mouth daily   sertraline (ZOLOFT) 50 mg tablet  Self Yes No   Sig: Take 50 mg by mouth daily   sucralfate (CARAFATE) 1 g tablet  Self Yes No   Sig: Take 1 g by mouth 4 (four) times a day      Facility-Administered Medications: None       Past Medical History:   Diagnosis Date    Gastric ulcer     GERD (gastroesophageal reflux disease)     Hypertension     Psychiatric disorder        Past Surgical History:   Procedure Laterality Date    KNEE SURGERY Right 2016 & 2018    ACL    KNEE SURGERY Left 2017    ACL    SHOULDER SURGERY Right 2016    posterior labrum and rotator cuff repair       Family History   Problem Relation Age of Onset    No Known Problems Mother     Heart disease Father     Hypertension Father      I have reviewed and agree with the history as documented  E-Cigarette/Vaping    E-Cigarette Use Never User      E-Cigarette/Vaping Substances    Nicotine No     Flavoring No      Social History     Tobacco Use    Smoking status: Current Every Day Smoker     Packs/day: 0 50    Smokeless tobacco: Current User     Types: Chew   Substance Use Topics    Alcohol use: Yes     Comment: in recovery     Drug use: Yes     Comment: recovery        Review of Systems   Constitutional: Negative for chills and fever  HENT: Negative for congestion and sore throat      Respiratory: Negative for cough and shortness of breath  Cardiovascular: Negative for chest pain and leg swelling  Gastrointestinal: Positive for abdominal pain, diarrhea, nausea and vomiting  Negative for constipation  Genitourinary: Negative for dysuria  Musculoskeletal: Negative for back pain and myalgias  Skin: Negative for color change, pallor and rash  Neurological: Negative for dizziness, speech difficulty, weakness, light-headedness and headaches  Psychiatric/Behavioral: Negative for confusion  All other systems reviewed and are negative  Physical Exam  Physical Exam  Vitals signs and nursing note reviewed  Constitutional:       General: He is not in acute distress  Appearance: Normal appearance  He is well-developed and well-groomed  He is obese  He is not ill-appearing or diaphoretic  HENT:      Head: Normocephalic and atraumatic  Right Ear: Hearing and external ear normal       Left Ear: Hearing and external ear normal       Nose: Nose normal    Eyes:      Conjunctiva/sclera: Conjunctivae normal       Pupils: Pupils are equal    Neck:      Musculoskeletal: Normal range of motion  Cardiovascular:      Rate and Rhythm: Regular rhythm  Tachycardia present  Heart sounds: Normal heart sounds  Pulmonary:      Effort: Pulmonary effort is normal       Breath sounds: Normal breath sounds  No wheezing, rhonchi or rales  Abdominal:      General: Abdomen is protuberant  Bowel sounds are normal       Palpations: Abdomen is soft  Tenderness: There is abdominal tenderness in the epigastric area, left upper quadrant and left lower quadrant  There is guarding  There is no right CVA tenderness, left CVA tenderness or rebound  Musculoskeletal: Normal range of motion  Right lower leg: No edema  Left lower leg: No edema  Skin:     General: Skin is warm and dry  Coloration: Skin is not jaundiced or pale  Findings: No rash  Neurological:      General: No focal deficit present        Mental Status: He is alert and oriented to person, place, and time  GCS: GCS eye subscore is 4  GCS verbal subscore is 5  GCS motor subscore is 6  Sensory: Sensation is intact  Motor: Motor function is intact  Psychiatric:         Mood and Affect: Affect is flat  Speech: Speech normal          Behavior: Behavior is cooperative  Vital Signs  ED Triage Vitals [03/19/21 1416]   Temperature Pulse Respirations Blood Pressure SpO2   98 2 °F (36 8 °C) (!) 144 20 (!) 175/100 100 %      Temp Source Heart Rate Source Patient Position - Orthostatic VS BP Location FiO2 (%)   Tympanic Monitor Lying Right arm --      Pain Score       7           Vitals:    03/19/21 1416 03/19/21 1600   BP: (!) 175/100 138/90   Pulse: (!) 144 (!) 110   Patient Position - Orthostatic VS: Lying          Visual Acuity      ED Medications  Medications   sodium chloride 0 9 % bolus 1,000 mL (1,000 mL Intravenous New Bag 3/19/21 1454)   potassium chloride (K-DUR,KLOR-CON) CR tablet 40 mEq (40 mEq Oral Given 3/19/21 1601)   iohexol (OMNIPAQUE) 350 MG/ML injection (MULTI-DOSE) 100 mL (100 mL Intravenous Given 3/19/21 1536)       Diagnostic Studies  Results Reviewed     Procedure Component Value Units Date/Time    TSH [[id number]]  (Normal) Collected: 03/19/21 1453    Lab Status: Final result Specimen: Blood from Arm, Left Updated: 03/19/21 1631     TSH 3RD GENERATON 2 320 uIU/mL     Narrative:      Patients undergoing fluorescein dye angiography may retain small amounts of fluorescein in the body for 48-72 hours post procedure  Samples containing fluorescein can produce falsely depressed TSH values  If the patient had this procedure,a specimen should be resubmitted post fluorescein clearance        Lipase [[id number]]  (Abnormal) Collected: 03/19/21 1453    Lab Status: Final result Specimen: Blood from Arm, Left Updated: 03/19/21 1527     Lipase 50 u/L     Comprehensive metabolic panel [[id number]]  (Abnormal) Collected: 03/19/21 1453    Lab Status: Final result Specimen: Blood from Arm, Left Updated: 03/19/21 1527     Sodium 140 mmol/L      Potassium 3 3 mmol/L      Chloride 101 mmol/L      CO2 26 mmol/L      ANION GAP 13 mmol/L      BUN 12 mg/dL      Creatinine 1 02 mg/dL      Glucose 65 mg/dL      Calcium 9 2 mg/dL      AST 40 U/L      ALT 67 U/L      Alkaline Phosphatase 84 U/L      Total Protein 7 8 g/dL      Albumin 4 0 g/dL      Total Bilirubin 0 50 mg/dL      eGFR 100 ml/min/1 73sq m     Narrative:      National Kidney Disease Foundation guidelines for Chronic Kidney Disease (CKD):     Stage 1 with normal or high GFR (GFR > 90 mL/min/1 73 square meters)    Stage 2 Mild CKD (GFR = 60-89 mL/min/1 73 square meters)    Stage 3A Moderate CKD (GFR = 45-59 mL/min/1 73 square meters)    Stage 3B Moderate CKD (GFR = 30-44 mL/min/1 73 square meters)    Stage 4 Severe CKD (GFR = 15-29 mL/min/1 73 square meters)    Stage 5 End Stage CKD (GFR <15 mL/min/1 73 square meters)  Note: GFR calculation is accurate only with a steady state creatinine    Urine Microscopic [[id number]]  (Abnormal) Collected: 03/19/21 1438    Lab Status: Final result Specimen: Urine, Clean Catch Updated: 03/19/21 1511     RBC, UA 10-20 /hpf      WBC, UA None Seen /hpf      Epithelial Cells None Seen /hpf      Bacteria, UA None Seen /hpf     CBC and differential [[id number]]  (Abnormal) Collected: 03/19/21 1453    Lab Status: Final result Specimen: Blood from Arm, Left Updated: 03/19/21 1502     WBC 12 37 Thousand/uL      RBC 4 92 Million/uL      Hemoglobin 16 1 g/dL      Hematocrit 46 7 %      MCV 95 fL      MCH 32 7 pg      MCHC 34 5 g/dL      RDW 11 6 %      MPV 8 9 fL      Platelets 523 Thousands/uL      nRBC 0 /100 WBCs      Neutrophils Relative 68 %      Immat GRANS % 0 %      Lymphocytes Relative 21 %      Monocytes Relative 9 %      Eosinophils Relative 1 %      Basophils Relative 1 %      Neutrophils Absolute 8 41 Thousands/µL      Immature Grans Absolute 0 04 Thousand/uL Lymphocytes Absolute 2 63 Thousands/µL      Monocytes Absolute 1 15 Thousand/µL      Eosinophils Absolute 0 06 Thousand/µL      Basophils Absolute 0 08 Thousands/µL     UA w Reflex to Microscopic w Reflex to Culture [[id number]]  (Abnormal) Collected: 03/19/21 1438    Lab Status: Final result Specimen: Urine, Clean Catch Updated: 03/19/21 1451     Color, UA Yellow     Clarity, UA Clear     Specific Gravity, UA >=1 030     pH, UA 5 5     Leukocytes, UA Negative     Nitrite, UA Negative     Protein, UA Negative mg/dl      Glucose, UA Negative mg/dl      Ketones, UA 15 (1+) mg/dl      Urobilinogen, UA 0 2 E U /dl      Bilirubin, UA Negative     Blood, UA Small                 CT abdomen pelvis with contrast   Final Result by Nury Meyers MD (03/19 1323)      No acute CT findings  Workstation performed: [id number]                    Procedures  ECG 12 Lead Documentation Only    Date/Time: 3/19/2021 2:38 PM  Performed by: Elena Vallejo PA-C  Authorized by: Elena Vallejo PA-C     Indications / Diagnosis:  Tachycardia  ECG reviewed by me, the ED Provider: yes    Patient location:  ED  Previous ECG:     Previous ECG:  Unavailable  Rate:     ECG rate:  125    ECG rate assessment: tachycardic    Rhythm:     Rhythm: sinus tachycardia    Conduction:     Conduction: normal    ST segments:     ST segments:  Normal  T waves:     T waves: normal               ED Course  ED Course as of Mar 19 1646   Fri Mar 19, 2021   1628 Tachycardia is chronic, not due to sepsis  MDM  Number of Diagnoses or Management Options  Abdominal pain: established and worsening  Hematuria: new and requires workup  Nausea and vomiting: new and requires workup  Diagnosis management comments: Patient with abdominal pain for over 1 year and nausea and vomiting off and on, will check labs to r/o electrolyte abnormality, diabetes,  or dehydration and CT scan to r/o colitis    Patient with chronic tachycardia, most likely due to anxiety, upon review or prior visits it is noted to be chronic  He was found to have hematuria, advised f/u with urologist   Will refer to GI for chronic abdominal pain and nausea  Amount and/or Complexity of Data Reviewed  Clinical lab tests: ordered and reviewed  Tests in the radiology section of CPT®: reviewed and ordered    Patient Progress  Patient progress: stable      Disposition  Final diagnoses:   Nausea and vomiting   Abdominal pain   Hematuria     Time reflects when diagnosis was documented in both MDM as applicable and the Disposition within this note     Time User Action Codes Description Comment    3/19/2021  4:38 PM Arlon Craft Add [R11 2] Nausea and vomiting     3/19/2021  4:38 PM Arlon Craft Add [R10 9] Abdominal pain     3/19/2021  4:38 PM Arlon Craft Add [R31 9] Hematuria       ED Disposition     ED Disposition Condition Date/Time Comment    Discharge Stable Fri Mar 19, 2021  4:38 PM [place] [address] discharge to home/self care              Follow-up Information     Follow up With Specialties Details Why Contact Info Additional [address] [facility] Schedule an appointment as soon as possible for a visit  concerning hematuria [address] [address] [place] [facility] [id number] [phone] [facility] [address], [place] [address]    [address] [facility] Call  For recheck of abdominal pain [address] [address] [place] [facility] [address] [address] [facility] [place] [address], [facility] [address], [address] [phone]          Patient's Medications   Discharge Prescriptions    ONDANSETRON (ZOFRAN-ODT) 4 MG DISINTEGRATING TABLET    Take 1 tablet (4 mg total) by mouth every 6 (six) hours as needed for nausea or vomiting       Start Date: 3/19/2021 End Date: --       Order Dose: 4 mg       Quantity: 20 tablet    Refills: 0     No discharge procedures on file      PDMP Review     None          ED Provider  Electronically Signed by           Timo Garcia PA-C  03/19/21 2715

## 2021-03-24 LAB
ATRIAL RATE: 125 BPM
P AXIS: 52 DEGREES
PR INTERVAL: 132 MS
QRS AXIS: 84 DEGREES
QRSD INTERVAL: 84 MS
QT INTERVAL: 308 MS
QTC INTERVAL: 444 MS
T WAVE AXIS: 19 DEGREES
VENTRICULAR RATE: 125 BPM

## 2021-03-24 PROCEDURE — 93010 ELECTROCARDIOGRAM REPORT: CPT | Performed by: INTERNAL MEDICINE

## 2022-11-08 ENCOUNTER — HOSPITAL ENCOUNTER (EMERGENCY)
Facility: HOSPITAL | Age: 27
Discharge: HOME | End: 2022-11-08
Attending: EMERGENCY MEDICINE | Admitting: EMERGENCY MEDICINE

## 2022-11-08 ENCOUNTER — APPOINTMENT (EMERGENCY)
Dept: RADIOLOGY | Facility: HOSPITAL | Age: 27
End: 2022-11-08

## 2022-11-08 ENCOUNTER — APPOINTMENT (EMERGENCY)
Dept: CT IMAGING | Facility: HOSPITAL | Age: 27
End: 2022-11-08

## 2022-11-08 VITALS
DIASTOLIC BLOOD PRESSURE: 100 MMHG | OXYGEN SATURATION: 95 % | HEART RATE: 104 BPM | BODY MASS INDEX: 35.15 KG/M2 | TEMPERATURE: 99.9 F | RESPIRATION RATE: 16 BRPM | SYSTOLIC BLOOD PRESSURE: 151 MMHG | HEIGHT: 70 IN

## 2022-11-08 DIAGNOSIS — R10.9 ABDOMINAL PAIN, UNSPECIFIED ABDOMINAL LOCATION: Primary | ICD-10-CM

## 2022-11-08 DIAGNOSIS — G89.29 CHRONIC ABDOMINAL PAIN: ICD-10-CM

## 2022-11-08 DIAGNOSIS — K22.6 MALLORY-WEISS TEAR: ICD-10-CM

## 2022-11-08 DIAGNOSIS — E86.0 DEHYDRATION: ICD-10-CM

## 2022-11-08 DIAGNOSIS — R11.10 RECURRENT VOMITING: ICD-10-CM

## 2022-11-08 DIAGNOSIS — R10.9 CHRONIC ABDOMINAL PAIN: ICD-10-CM

## 2022-11-08 DIAGNOSIS — R11.2 NAUSEA AND VOMITING: ICD-10-CM

## 2022-11-08 LAB
ALBUMIN SERPL BCP-MCNC: 4 G/DL (ref 3.5–5)
ALP SERPL-CCNC: 86 U/L (ref 46–116)
ALT SERPL W P-5'-P-CCNC: 73 U/L (ref 12–78)
ANION GAP SERPL CALCULATED.3IONS-SCNC: 14 MMOL/L (ref 4–13)
ANION GAP SERPL CALCULATED.3IONS-SCNC: 8 MMOL/L (ref 4–13)
AST SERPL W P-5'-P-CCNC: 39 U/L (ref 5–45)
BACTERIA UR QL AUTO: NORMAL /HPF
BASE EXCESS BLDA CALC-SCNC: 2 MMOL/L (ref -2–3)
BASE EXCESS BLDA CALC-SCNC: 2 MMOL/L (ref -2–3)
BASOPHILS # BLD AUTO: 0.05 THOUSANDS/ÂΜL (ref 0–0.1)
BASOPHILS NFR BLD AUTO: 0 % (ref 0–1)
BILIRUB SERPL-MCNC: 1 MG/DL (ref 0.2–1)
BILIRUB UR QL STRIP: NEGATIVE
BUN SERPL-MCNC: 14 MG/DL (ref 5–25)
BUN SERPL-MCNC: 18 MG/DL (ref 5–25)
CA-I BLD-SCNC: 1.08 MMOL/L (ref 1.12–1.32)
CA-I BLD-SCNC: 1.08 MMOL/L (ref 1.12–1.32)
CALCIUM SERPL-MCNC: 8.5 MG/DL (ref 8.3–10.1)
CALCIUM SERPL-MCNC: 9.6 MG/DL (ref 8.3–10.1)
CHLORIDE SERPL-SCNC: 100 MMOL/L (ref 96–108)
CHLORIDE SERPL-SCNC: 97 MMOL/L (ref 96–108)
CLARITY UR: CLEAR
CO2 SERPL-SCNC: 19 MMOL/L (ref 21–32)
CO2 SERPL-SCNC: 27 MMOL/L (ref 21–32)
COLOR UR: YELLOW
CREAT SERPL-MCNC: 0.78 MG/DL (ref 0.6–1.3)
CREAT SERPL-MCNC: 0.94 MG/DL (ref 0.6–1.3)
EOSINOPHIL # BLD AUTO: 0.01 THOUSAND/ÂΜL (ref 0–0.61)
EOSINOPHIL NFR BLD AUTO: 0 % (ref 0–6)
ERYTHROCYTE [DISTWIDTH] IN BLOOD BY AUTOMATED COUNT: 11.9 % (ref 11.6–15.1)
GFR SERPL CREATININE-BSD FRML MDRD: 109 ML/MIN/1.73SQ M
GFR SERPL CREATININE-BSD FRML MDRD: 121 ML/MIN/1.73SQ M
GLUCOSE SERPL-MCNC: 95 MG/DL (ref 65–140)
GLUCOSE SERPL-MCNC: 95 MG/DL (ref 65–140)
GLUCOSE SERPL-MCNC: 99 MG/DL (ref 65–140)
GLUCOSE SERPL-MCNC: 99 MG/DL (ref 65–140)
GLUCOSE UR STRIP-MCNC: NEGATIVE MG/DL
HCO3 BLDA-SCNC: 23.9 MMOL/L (ref 24–30)
HCO3 BLDA-SCNC: 23.9 MMOL/L (ref 24–30)
HCT VFR BLD AUTO: 50.4 % (ref 36.5–49.3)
HCT VFR BLD CALC: 48 % (ref 36.5–49.3)
HCT VFR BLD CALC: 48 % (ref 36.5–49.3)
HGB BLD-MCNC: 17.2 G/DL (ref 12–17)
HGB BLDA-MCNC: 16.3 G/DL (ref 12–17)
HGB BLDA-MCNC: 16.3 G/DL (ref 12–17)
HGB UR QL STRIP.AUTO: NEGATIVE
IMM GRANULOCYTES # BLD AUTO: 0.04 THOUSAND/UL (ref 0–0.2)
IMM GRANULOCYTES NFR BLD AUTO: 0 % (ref 0–2)
INR PPP: 0.95 (ref 0.84–1.19)
KETONES UR STRIP-MCNC: ABNORMAL MG/DL
LACTATE SERPL-SCNC: 0.9 MMOL/L (ref 0.5–2)
LEUKOCYTE ESTERASE UR QL STRIP: NEGATIVE
LIPASE SERPL-CCNC: 56 U/L (ref 73–393)
LYMPHOCYTES # BLD AUTO: 2.45 THOUSANDS/ÂΜL (ref 0.6–4.47)
LYMPHOCYTES NFR BLD AUTO: 18 % (ref 14–44)
MCH RBC QN AUTO: 34 PG (ref 26.8–34.3)
MCHC RBC AUTO-ENTMCNC: 34.1 G/DL (ref 31.4–37.4)
MCV RBC AUTO: 100 FL (ref 82–98)
MONOCYTES # BLD AUTO: 1 THOUSAND/ÂΜL (ref 0.17–1.22)
MONOCYTES NFR BLD AUTO: 7 % (ref 4–12)
NEUTROPHILS # BLD AUTO: 10.17 THOUSANDS/ÂΜL (ref 1.85–7.62)
NEUTS SEG NFR BLD AUTO: 75 % (ref 43–75)
NITRITE UR QL STRIP: NEGATIVE
NON-SQ EPI CELLS URNS QL MICRO: NORMAL /HPF
NRBC BLD AUTO-RTO: 0 /100 WBCS
PCO2 BLD: 25 MMOL/L (ref 21–32)
PCO2 BLD: 25 MMOL/L (ref 21–32)
PCO2 BLD: 30.9 MM HG (ref 42–50)
PCO2 BLD: 30.9 MM HG (ref 42–50)
PH BLD: 7.5 [PH] (ref 7.3–7.4)
PH BLD: 7.5 [PH] (ref 7.3–7.4)
PH UR STRIP.AUTO: 6 [PH]
PLATELET # BLD AUTO: 326 THOUSANDS/UL (ref 149–390)
PMV BLD AUTO: 9.7 FL (ref 8.9–12.7)
PO2 BLD: 50 MM HG (ref 35–45)
PO2 BLD: 50 MM HG (ref 35–45)
POTASSIUM BLD-SCNC: 3.7 MMOL/L (ref 3.5–5.3)
POTASSIUM BLD-SCNC: 3.7 MMOL/L (ref 3.5–5.3)
POTASSIUM SERPL-SCNC: 3.5 MMOL/L (ref 3.5–5.3)
POTASSIUM SERPL-SCNC: 5 MMOL/L (ref 3.5–5.3)
PROT SERPL-MCNC: 8.6 G/DL (ref 6.4–8.4)
PROT UR STRIP-MCNC: ABNORMAL MG/DL
PROTHROMBIN TIME: 13.4 SECONDS (ref 11.6–14.5)
RBC # BLD AUTO: 5.06 MILLION/UL (ref 3.88–5.62)
RBC #/AREA URNS AUTO: NORMAL /HPF
SAO2 % BLD FROM PO2: 88 % (ref 60–85)
SAO2 % BLD FROM PO2: 88 % (ref 60–85)
SODIUM BLD-SCNC: 134 MMOL/L (ref 136–145)
SODIUM BLD-SCNC: 134 MMOL/L (ref 136–145)
SODIUM SERPL-SCNC: 130 MMOL/L (ref 135–147)
SODIUM SERPL-SCNC: 135 MMOL/L (ref 135–147)
SP GR UR STRIP.AUTO: 1.02 (ref 1–1.03)
SPECIMEN SOURCE: ABNORMAL
SPECIMEN SOURCE: ABNORMAL
UROBILINOGEN UR STRIP-ACNC: <2 MG/DL
WBC # BLD AUTO: 13.72 THOUSAND/UL (ref 4.31–10.16)
WBC #/AREA URNS AUTO: NORMAL /HPF

## 2022-11-08 RX ORDER — DICYCLOMINE HCL 20 MG
20 TABLET ORAL 2 TIMES DAILY
Qty: 20 TABLET | Refills: 0 | Status: SHIPPED | OUTPATIENT
Start: 2022-11-08

## 2022-11-08 RX ORDER — MODAFINIL 100 MG/1
100 TABLET ORAL DAILY
COMMUNITY

## 2022-11-08 RX ORDER — HYDROMORPHONE HCL IN WATER/PF 6 MG/30 ML
0.2 PATIENT CONTROLLED ANALGESIA SYRINGE INTRAVENOUS ONCE
Status: COMPLETED | OUTPATIENT
Start: 2022-11-08 | End: 2022-11-08

## 2022-11-08 RX ORDER — HYDROMORPHONE HCL/PF 1 MG/ML
1 SYRINGE (ML) INJECTION ONCE
Status: COMPLETED | OUTPATIENT
Start: 2022-11-08 | End: 2022-11-08

## 2022-11-08 RX ORDER — ONDANSETRON 4 MG/1
4 TABLET, ORALLY DISINTEGRATING ORAL EVERY 6 HOURS PRN
Qty: 20 TABLET | Refills: 0 | Status: SHIPPED | OUTPATIENT
Start: 2022-11-08

## 2022-11-08 RX ORDER — ONDANSETRON 2 MG/ML
4 INJECTION INTRAMUSCULAR; INTRAVENOUS ONCE
Status: COMPLETED | OUTPATIENT
Start: 2022-11-08 | End: 2022-11-08

## 2022-11-08 RX ADMIN — SODIUM CHLORIDE, SODIUM LACTATE, POTASSIUM CHLORIDE, AND CALCIUM CHLORIDE 1000 ML: .6; .31; .03; .02 INJECTION, SOLUTION INTRAVENOUS at 11:44

## 2022-11-08 RX ADMIN — SODIUM CHLORIDE, SODIUM LACTATE, POTASSIUM CHLORIDE, AND CALCIUM CHLORIDE 1000 ML: .6; .31; .03; .02 INJECTION, SOLUTION INTRAVENOUS at 13:24

## 2022-11-08 RX ADMIN — HYDROMORPHONE HYDROCHLORIDE 0.2 MG: 0.2 INJECTION, SOLUTION INTRAMUSCULAR; INTRAVENOUS; SUBCUTANEOUS at 11:47

## 2022-11-08 RX ADMIN — IOHEXOL 100 ML: 350 INJECTION, SOLUTION INTRAVENOUS at 11:51

## 2022-11-08 RX ADMIN — HYDROMORPHONE HYDROCHLORIDE 1 MG: 1 INJECTION, SOLUTION INTRAMUSCULAR; INTRAVENOUS; SUBCUTANEOUS at 13:01

## 2022-11-08 RX ADMIN — ONDANSETRON 4 MG: 2 INJECTION INTRAMUSCULAR; INTRAVENOUS at 11:46

## 2022-11-08 NOTE — DISCHARGE INSTRUCTIONS
Return to emergency department if worsening vomiting especially with ongoing bleeding, etc   Reassuring examination with no repeated vomiting here in emergency department  Will prescribe Zofran for vomiting and Bentyl in the outpatient setting for abdominal pain  Follow-up with GI as instructed for chronic abdominal pain

## 2022-11-08 NOTE — ED PROVIDER NOTES
History  Chief Complaint   Patient presents with   • Abdominal Pain     Patient reports left lower flank/back pain for the last month that radiates into stomach, patient reports diarrhea and loose stools "for a while"   • Vomiting Blood     Patient also complains of multiple episodes of bright red/coffee ground emesis in vomiting/diarrhea     29-year-old male past medical history of GERD on Carafate Protonix, HTN with ongoing left-sided abdominal pain for greater than 1 month intermittent and vomiting ongoing x1 week intermittently becoming blood streaked and with brown contents over the last 2-3 days  States he is afebrile  No diarrhea but having loose stools intermittently x1 month  States he went to work this morning and was vomiting blood in the bathroom  States that the vomitus is intermittent and progresses  Reports yellowish vomit and then with subsequent episodes mildly bloody streaky and subsequent brownish with coffee-ground material   Sent to Emergency Department from work  In March 2021 according patient and has been considering seeing Gastroenterology  Abdominal Pain  Associated symptoms: nausea and vomiting        Prior to Admission Medications   Prescriptions Last Dose Informant Patient Reported? Taking?    AFLURIA QUADRIVALENT 0 5 ML MARILEE Not Taking at Unknown time Self Yes No   Sig: inject 0 5 milliliter intramuscularly   Patient not taking: Reported on 11/8/2022   NARCAN 4 MG/0 1ML LIQD Not Taking at Unknown time Self Yes No   Sig: FOLLOW KIT DIRECTIONS   Patient not taking: Reported on 11/8/2022   Potassium (POTASSIMIN PO)   Yes Yes   Sig: Take 20 mg by mouth   QUEtiapine (SEROquel) 200 mg tablet Not Taking at Unknown time Self Yes No   Sig: Take 200 mg by mouth 3 (three) times a day   Patient not taking: Reported on 11/8/2022   QUEtiapine (SEROquel) 400 MG tablet Not Taking at Unknown time Self Yes No   Sig: Take 400 mg by mouth daily at bedtime   Patient not taking: Reported on 11/8/2022   amitriptyline (ELAVIL) 100 mg tablet Not Taking at Unknown time Self Yes No   Sig: Take 400 mg by mouth daily   Patient not taking: Reported on 11/8/2022   gabapentin (NEURONTIN) 800 mg tablet 11/8/2022 at Unknown time Self Yes Yes   Sig: Take 800 mg by mouth 3 (three) times a day   lidocaine-prilocaine (EMLA) cream Not Taking at Unknown time Self Yes No   Sig: APPLY 3 TIMES A DAY TOPICALLY AS DIRECTED   Patient not taking: Reported on 11/8/2022   losartan (COZAAR) 25 mg tablet Not Taking at Unknown time Self Yes No   Sig: Take 25 mg by mouth daily   Patient not taking: Reported on 11/8/2022   modafinil (PROVIGIL) 100 mg tablet   Yes Yes   Sig: Take 100 mg by mouth daily   nicotine (NICODERM CQ) 14 mg/24hr TD 24 hr patch Not Taking at Unknown time Self No No   Sig: Place 1 patch on the skin every 24 hours   Patient not taking: No sig reported   ondansetron (ZOFRAN-ODT) 4 mg disintegrating tablet Not Taking at Unknown time  No No   Sig: Take 1 tablet (4 mg total) by mouth every 6 (six) hours as needed for nausea or vomiting   Patient not taking: Reported on 11/8/2022   pantoprazole (PROTONIX) 40 mg tablet Not Taking at Unknown time Self Yes No   Sig: Take 40 mg by mouth daily   Patient not taking: Reported on 11/8/2022   prazosin (MINIPRESS) 1 mg capsule Not Taking at Unknown time Self Yes No   Sig: Take 1 mg by mouth daily   Patient not taking: Reported on 11/8/2022   propranolol (INDERAL) 10 mg tablet Not Taking at Unknown time  Yes No   Sig: Take 10 mg by mouth 3 (three) times a day   Patient not taking: Reported on 11/8/2022   sertraline (ZOLOFT) 100 mg tablet Not Taking at Unknown time Self Yes No   Sig: Take 100 mg by mouth daily   Patient not taking: Reported on 11/8/2022   sertraline (ZOLOFT) 50 mg tablet Not Taking at Unknown time Self Yes No   Sig: Take 50 mg by mouth daily   Patient not taking: Reported on 11/8/2022   sucralfate (CARAFATE) 1 g tablet Not Taking at Unknown time Self Yes No Sig: Take 1 g by mouth 4 (four) times a day   Patient not taking: Reported on 11/8/2022      Facility-Administered Medications: None       Past Medical History:   Diagnosis Date   • Gastric ulcer    • GERD (gastroesophageal reflux disease)    • Hypertension    • Psychiatric disorder        Past Surgical History:   Procedure Laterality Date   • KNEE SURGERY Right 2016 & 2018    ACL   • KNEE SURGERY Left 2017    ACL   • SHOULDER SURGERY Right 2016    posterior labrum and rotator cuff repair       Family History   Problem Relation Age of Onset   • No Known Problems Mother    • Heart disease Father    • Hypertension Father      I have reviewed and agree with the history as documented  E-Cigarette/Vaping   • E-Cigarette Use Never User      E-Cigarette/Vaping Substances   • Nicotine No    • THC No    • CBD No    • Flavoring No    • Other No    • Unknown No      Social History     Tobacco Use   • Smoking status: Current Every Day Smoker     Packs/day: 0 50   • Smokeless tobacco: Current User     Types: Chew   Vaping Use   • Vaping Use: Never used   Substance Use Topics   • Alcohol use: Yes     Comment: 4 beers and 2 mixed drinks   • Drug use: Yes     Comment: recovery        Review of Systems   Gastrointestinal: Positive for abdominal pain (Left-side and ongoing x 1 month (worse over last 2 weeks)), blood in stool, nausea and vomiting  Genitourinary: Positive for flank pain  All other systems reviewed and are negative  Physical Exam  Physical Exam  Vitals and nursing note reviewed  Constitutional:       General: He is not in acute distress  Appearance: He is well-developed  He is ill-appearing  HENT:      Head: Normocephalic and atraumatic  Mouth/Throat:      Mouth: Mucous membranes are dry  Eyes:      Conjunctiva/sclera: Conjunctivae normal    Cardiovascular:      Rate and Rhythm: Normal rate and regular rhythm  Heart sounds: No murmur heard    Pulmonary:      Effort: Pulmonary effort is normal  No respiratory distress  Breath sounds: Normal breath sounds  Abdominal:      Palpations: Abdomen is soft  Tenderness: There is abdominal tenderness  There is left CVA tenderness  Musculoskeletal:      Cervical back: Neck supple  Skin:     General: Skin is warm and dry  Neurological:      Mental Status: He is alert           Vital Signs  ED Triage Vitals   Temperature Pulse Respirations Blood Pressure SpO2   11/08/22 1103 11/08/22 1055 11/08/22 1055 11/08/22 1055 11/08/22 1055   99 9 °F (37 7 °C) (!) 133 16 (!) 192/122 98 %      Temp Source Heart Rate Source Patient Position - Orthostatic VS BP Location FiO2 (%)   11/08/22 1103 11/08/22 1055 11/08/22 1101 11/08/22 1101 --   Temporal Monitor Lying Right arm       Pain Score       11/08/22 1055       8           Vitals:    11/08/22 1145 11/08/22 1230 11/08/22 1435 11/08/22 1530   BP: 147/72 152/90 139/82 151/100   Pulse: 104 103 (!) 107 104   Patient Position - Orthostatic VS:             Visual Acuity      ED Medications  Medications   ondansetron (ZOFRAN) injection 4 mg (4 mg Intravenous Given 11/8/22 1146)   lactated ringers bolus 1,000 mL (0 mL Intravenous Stopped 11/8/22 1546)   HYDROmorphone HCl (DILAUDID) injection 0 2 mg (0 2 mg Intravenous Given 11/8/22 1147)   iohexol (OMNIPAQUE) 350 MG/ML injection (SINGLE-DOSE) 100 mL (100 mL Intravenous Given 11/8/22 1151)   HYDROmorphone (DILAUDID) injection 1 mg (1 mg Intravenous Given 11/8/22 1301)   lactated ringers bolus 1,000 mL (0 mL Intravenous Stopped 11/8/22 1546)       Diagnostic Studies  Results Reviewed     Procedure Component Value Units Date/Time    Basic metabolic panel [[id number]] Collected: 11/08/22 1436    Lab Status: Final result Specimen: Blood from Arm, Left Updated: 11/08/22 1526     Sodium 135 mmol/L      Potassium 3 5 mmol/L      Chloride 100 mmol/L      CO2 27 mmol/L      ANION GAP 8 mmol/L      BUN 14 mg/dL      Creatinine 0 94 mg/dL      Glucose 99 mg/dL      Calcium 8 5 mg/dL      eGFR 109 ml/min/1 73sq m     Narrative:      [place] guidelines for Chronic Kidney Disease (CKD):   •  Stage 1 with normal or high GFR (GFR > 90 mL/min/1 73 square meters)  •  Stage 2 Mild CKD (GFR = 60-89 mL/min/1 73 square meters)  •  Stage 3A Moderate CKD (GFR = 45-59 mL/min/1 73 square meters)  •  Stage 3B Moderate CKD (GFR = 30-44 mL/min/1 73 square meters)  •  Stage 4 Severe CKD (GFR = 15-29 mL/min/1 73 square meters)  •  Stage 5 End Stage CKD (GFR <15 mL/min/1 73 square meters)  Note: GFR calculation is accurate only with a steady state creatinine    Urine Microscopic [[id number]]  (Normal) Collected: 11/08/22 1301    Lab Status: Final result Specimen: Urine, Clean Catch Updated: 11/08/22 1406     RBC, UA 1-2 /hpf      WBC, UA 1-2 /hpf      Epithelial Cells Occasional /hpf      Bacteria, UA Occasional /hpf     UA w Reflex to Microscopic w Reflex to Culture [[id number]]  (Abnormal) Collected: 11/08/22 1301    Lab Status: Final result Specimen: Urine, Clean Catch Updated: 11/08/22 1320     Color, UA Yellow     Clarity, UA Clear     Specific [place], UA 1 020     pH, UA 6 0     Leukocytes, UA Negative     Nitrite, UA Negative     Protein, UA 30 (1+) mg/dl      Glucose, UA Negative mg/dl      Ketones, UA 40 (2+) mg/dl      Urobilinogen, UA <2 0 mg/dl      Bilirubin, UA Negative     Occult Blood, UA Negative    POCT Blood Gas (CG8+) [[id number]]  (Abnormal) Collected: 11/08/22 1141    Lab Status: Final result Specimen: Venous Updated: 11/08/22 1228     ph, Tin ISTAT 7 496     pCO2, Tin i-STAT 30 9 mm HG      pO2, Tin i-STAT 50 0 mm HG      BE, i-STAT 2 mmol/L      HCO3, Tin i-STAT 23 9 mmol/L      CO2, i-STAT 25 mmol/L      O2 Sat, i-STAT 88 %      SODIUM, I-STAT 134 mmol/l      Potassium, i-STAT 3 7 mmol/L      Calcium, Ionized i-STAT 1 08 mmol/L      Hct, i-STAT 48 %      Hgb, i-STAT 16 3 g/dl      Glucose, i-STAT 95 mg/dl      Specimen Type VENOUS    Lactic acid, plasma [[id number]]  (Normal) Collected: 11/08/22 1136    Lab Status: Final result Specimen: Blood from Arm, Left Updated: 11/08/22 1211     LACTIC ACID 0 9 mmol/L     Narrative:      Result may be elevated if tourniquet was used during collection      Protime-INR [[id number]]  (Normal) Collected: 11/08/22 1136    Lab Status: Final result Specimen: Blood from Arm, Left Updated: 11/08/22 1204     Protime 13 4 seconds      INR 0 95    POCT Blood Gas (CG8+) [[id number]]  (Abnormal) Collected: 11/08/22 1141    Lab Status: Final result Specimen: Venous Updated: 11/08/22 1151     ph, Tin ISTAT 7 496     pCO2, Tin i-STAT 30 9 mm HG      pO2, Tin i-STAT 50 0 mm HG      BE, i-STAT 2 mmol/L      HCO3, Tin i-STAT 23 9 mmol/L      CO2, i-STAT 25 mmol/L      O2 Sat, i-STAT 88 %      SODIUM, I-STAT 134 mmol/l      Potassium, i-STAT 3 7 mmol/L      Calcium, Ionized i-STAT 1 08 mmol/L      Hct, i-STAT 48 %      Hgb, i-STAT 16 3 g/dl      Glucose, i-STAT 95 mg/dl      Specimen Type VENOUS    CMP [[id number]]  (Abnormal) Collected: 11/08/22 1109    Lab Status: Final result Specimen: Blood from Arm, Left Updated: 11/08/22 1141     Sodium 130 mmol/L      Potassium 5 0 mmol/L      Chloride 97 mmol/L      CO2 19 mmol/L      ANION GAP 14 mmol/L      BUN 18 mg/dL      Creatinine 0 78 mg/dL      Glucose 99 mg/dL      Calcium 9 6 mg/dL      AST 39 U/L      ALT 73 U/L      Alkaline Phosphatase 86 U/L      Total Protein 8 6 g/dL      Albumin 4 0 g/dL      Total Bilirubin 1 00 mg/dL      eGFR 121 ml/min/1 73sq m     Narrative:      [place] guidelines for Chronic Kidney Disease (CKD):   •  Stage 1 with normal or high GFR (GFR > 90 mL/min/1 73 square meters)  •  Stage 2 Mild CKD (GFR = 60-89 mL/min/1 73 square meters)  •  Stage 3A Moderate CKD (GFR = 45-59 mL/min/1 73 square meters)  •  Stage 3B Moderate CKD (GFR = 30-44 mL/min/1 73 square meters)  •  Stage 4 Severe CKD (GFR = 15-29 mL/min/1 73 square meters)  •  Stage 5 End Stage CKD (GFR <15 mL/min/1 73 square meters)  Note: GFR calculation is accurate only with a steady state creatinine    Lipase [[id number]]  (Abnormal) Collected: 11/08/22 1109    Lab Status: Final result Specimen: Blood from Arm, Left Updated: 11/08/22 1141     Lipase 56 u/L     Occult Blood 1-3 Stool, (Diagnostic) [[id number]]     Lab Status: No result Specimen: Stool from Rectum     CBC and differential [[id number]]  (Abnormal) Collected: 11/08/22 1109    Lab Status: Final result Specimen: Blood from Arm, Left Updated: 11/08/22 1115     WBC 13 72 Thousand/uL      RBC 5 06 Million/uL      Hemoglobin 17 2 g/dL      Hematocrit 50 4 %       fL      MCH 34 0 pg      MCHC 34 1 g/dL      RDW 11 9 %      MPV 9 7 fL      Platelets 447 Thousands/uL      nRBC 0 /100 WBCs      Neutrophils Relative 75 %      Immat GRANS % 0 %      Lymphocytes Relative 18 %      Monocytes Relative 7 %      Eosinophils Relative 0 %      Basophils Relative 0 %      Neutrophils Absolute 10 17 Thousands/µL      Immature Grans Absolute 0 04 Thousand/uL      Lymphocytes Absolute 2 45 Thousands/µL      Monocytes Absolute 1 00 Thousand/µL      Eosinophils Absolute 0 01 Thousand/µL      Basophils Absolute 0 05 Thousands/µL                  CT abdomen pelvis with contrast   Final Result by Sabina Ortega MD (11/08 1214)      No acute intra-abdominal abnormality  Hepatic steatosis  Workstation performed: [id number]         XR chest portable   Final Result by Lazarus Goldman, MD (11/08 1612)      No acute cardiopulmonary disease                    Workstation performed: [id number]                    Procedures  ECG 12 Lead Documentation Only    Date/Time: 11/8/2022 10:59 AM  Performed by: Xavi Raymundo PA-C  Authorized by: Xavi Raymundo PA-C     Indications / Diagnosis:  VOMITING BLOOD  ECG reviewed by me, the ED Provider: yes    Patient location:  ED  Previous ECG:     Comparison to cardiac monitor: Yes Interpretation:     Interpretation: normal    Rate:     ECG rate:  130    ECG rate assessment: tachycardic    Rhythm:     Rhythm: sinus rhythm    Ectopy:     Ectopy: none    QRS:     QRS axis:  Normal  Conduction:     Conduction: normal    ST segments:     ST segments:  Normal  T waves:     T waves: normal               ED Course  ED Course as of 11/08/22 2253   Tue Nov 08, 2022   1105 Workup labs, fluid and Zofran ordered   1117 Chest x-ray normal   Tachycardic and hypertensive in triage complaining of 8 out 10 left abdominal/flank  Ordered 0 25 dilaudid  CTA abdomen pelvis  Differential considered for possible left renal colic due to renal stone verses left-sided diverticulitis  1259 Patient is still complaining of 8/10 pain ordered additional 1 mg of Dilaudid  Ordered additional L bolus lactated Ringer's  1259 FOBT guaiac negative   1309 normal CT imaging  1326 Pain 3/10    1537 Patient pain-free  No repeated vomiting  Discussed need for follow-up with GI in the outpatient setting for referral   Discussed need for return emergency department if developing recurrent hematemesis or otherwise GI bleeding  In setting of chronic on ongoing abdominal pain needs likely endoscopy and colonoscopy  Referral to GI placed  Consideration for otherwise subacute or near chronic ongoing abdominal pain as patient has visited emergency department for abdominal pain prior  Other consideration for possible underlying motility disorder  Current admission probably representative acute gastroenteritis on top of chronic abdominal pain  [id number] Discharged                                               MDM  Number of Diagnoses or Management Options  Abdominal pain, unspecified abdominal location: established and worsening  Chronic abdominal pain: established and worsening  Dehydration: new and requires workup  Tammie-Chong tear: new and requires workup  Nausea and vomiting: established and worsening  Recurrent vomiting: established and worsening     Amount and/or Complexity of Data Reviewed  Clinical lab tests: ordered and reviewed  Tests in the radiology section of CPT®: ordered and reviewed        Disposition  Final diagnoses:   Abdominal pain, unspecified abdominal location   Nausea and vomiting   Recurrent vomiting   Chronic abdominal pain   Tammie-Chong tear - Clinical Diagnosis   Dehydration     Time reflects when diagnosis was documented in both MDM as applicable and the Disposition within this note     Time User Action Codes Description Comment    11/8/2022  1:00 PM Lesle Lawman Add [R10 9] Abdominal pain, unspecified abdominal location     11/8/2022  1:00 PM Lesle Lawman Add [R11 2] Nausea and vomiting     11/8/2022  1:37 PM Lesle Lawman Add [R11 10] Recurrent vomiting     11/8/2022  1:37 PM Lesle Lawman Add [R10 9,  G89 29] Chronic abdominal pain     11/8/2022  3:27 PM Lesle Lawman Add [K22 6] Tammie-Chong tear     11/8/2022  3:27 PM Lesle Lawman Modify [K22 6] Tammie-Chong tear Clinical Diagnosis    11/8/2022 10:52 PM Lesle Lawman Add [E86 0] Dehydration       ED Disposition     ED Disposition   Discharge    Condition   Stable    Date/Time   Tue Nov 8, 2022  3:27 PM    Comment   Marjan Jasso discharge to home/self care                 Follow-up Information     Follow up With Specialties Details Why Contact Info Additional [address], DO Internal Medicine Schedule an appointment as soon as possible for a visit   [address] [id number]  [phone]       [address] [facility] [place] [facility] Call today Call to schedule appointment [address] [place] [phone]  [address] [facility] [place] [address] [place] [address] [facility]     [phone]          Discharge Medication List as of 11/8/2022  3:32 PM      START taking these medications    Details   dicyclomine (BENTYL) 20 mg tablet Take 1 tablet (20 mg total) by mouth 2 (two) times a day, Starting Tue 11/8/2022, Normal      !! ondansetron (Zofran ODT) 4 mg disintegrating tablet Take 1 tablet (4 mg total) by mouth every 6 (six) hours as needed for nausea or vomiting, Starting Tue 11/8/2022, Normal       !! - Potential duplicate medications found  Please discuss with provider        CONTINUE these medications which have NOT CHANGED    Details   gabapentin (NEURONTIN) 800 mg tablet Take 800 mg by mouth 3 (three) times a day, Starting Mon 9/17/2018, Historical Med      modafinil (PROVIGIL) 100 mg tablet Take 100 mg by mouth daily, Historical Med      Potassium (POTASSIMIN PO) Take 20 mg by mouth, Historical Med      AFLURIA QUADRIVALENT 0 5 ML MARILEE inject 0 5 milliliter intramuscularly, Historical Med      amitriptyline (ELAVIL) 100 mg tablet Take 400 mg by mouth daily, Starting Tue 9/18/2018, Historical Med      lidocaine-prilocaine (EMLA) cream APPLY 3 TIMES A DAY TOPICALLY AS DIRECTED, Historical Med      losartan (COZAAR) 25 mg tablet Take 25 mg by mouth daily, Starting Mon 9/17/2018, Historical Med      NARCAN 4 MG/0 1ML LIQD FOLLOW KIT DIRECTIONS, Historical Med      nicotine (NICODERM CQ) 14 mg/24hr TD 24 hr patch Place 1 patch on the skin every 24 hours, Starting Fri 8/10/2018, Print      !! ondansetron (ZOFRAN-ODT) 4 mg disintegrating tablet Take 1 tablet (4 mg total) by mouth every 6 (six) hours as needed for nausea or vomiting, Starting Fri 3/19/2021, Normal      pantoprazole (PROTONIX) 40 mg tablet Take 40 mg by mouth daily, Starting Mon 9/17/2018, Historical Med      prazosin (MINIPRESS) 1 mg capsule Take 1 mg by mouth daily, Starting Mon 9/17/2018, Historical Med      propranolol (INDERAL) 10 mg tablet Take 10 mg by mouth 3 (three) times a day, Historical Med      !! QUEtiapine (SEROquel) 200 mg tablet Take 200 mg by mouth 3 (three) times a day, Starting Mon 9/17/2018, Historical Med      !! QUEtiapine (SEROquel) 400 MG tablet Take 400 mg by mouth daily at bedtime, Starting Mon 9/17/2018, Historical Med      !! sertraline (ZOLOFT) 100 mg tablet Take 100 mg by mouth daily, Starting Mon 9/17/2018, Historical Med      !! sertraline (ZOLOFT) 50 mg tablet Take 50 mg by mouth daily, Starting Mon 9/17/2018, Historical Med      sucralfate (CARAFATE) 1 g tablet Take 1 g by mouth 4 (four) times a day, Starting Mon 9/17/2018, Historical Med       !! - Potential duplicate medications found  Please discuss with provider                PDMP Review     None          ED Provider  Electronically Signed by           Lin Chen PA-C  11/08/22 7325

## 2022-11-11 LAB
ATRIAL RATE: 130 BPM
P AXIS: 55 DEGREES
PR INTERVAL: 132 MS
QRS AXIS: 74 DEGREES
QRSD INTERVAL: 82 MS
QT INTERVAL: 294 MS
QTC INTERVAL: 432 MS
T WAVE AXIS: 9 DEGREES
VENTRICULAR RATE: 130 BPM

## 2022-11-30 ENCOUNTER — OFFICE VISIT (OUTPATIENT)
Dept: GASTROENTEROLOGY | Facility: MEDICAL CENTER | Age: 27
End: 2022-11-30

## 2022-11-30 VITALS
DIASTOLIC BLOOD PRESSURE: 82 MMHG | WEIGHT: 259.4 LBS | BODY MASS INDEX: 37.14 KG/M2 | SYSTOLIC BLOOD PRESSURE: 146 MMHG | HEIGHT: 70 IN

## 2022-11-30 DIAGNOSIS — G89.29 CHRONIC ABDOMINAL PAIN: ICD-10-CM

## 2022-11-30 DIAGNOSIS — K92.0 HEMATEMESIS WITH NAUSEA: ICD-10-CM

## 2022-11-30 DIAGNOSIS — R11.2 NAUSEA AND VOMITING: Primary | ICD-10-CM

## 2022-11-30 DIAGNOSIS — K92.1 MELENA: ICD-10-CM

## 2022-11-30 DIAGNOSIS — R10.9 CHRONIC ABDOMINAL PAIN: ICD-10-CM

## 2022-11-30 RX ORDER — OMEPRAZOLE 40 MG/1
40 CAPSULE, DELAYED RELEASE ORAL
Qty: 30 CAPSULE | Refills: 3 | Status: SHIPPED | OUTPATIENT
Start: 2022-11-30

## 2022-11-30 RX ORDER — SUCRALFATE ORAL 1 G/10ML
1 SUSPENSION ORAL
Qty: 414 ML | Refills: 0 | Status: SHIPPED | OUTPATIENT
Start: 2022-11-30

## 2022-11-30 NOTE — H&P (VIEW-ONLY)
[facility] - Outpatient Consultation  Jessica Kamara 32 y o  male MRN: [id number]  Encounter: [id number]          ASSESSMENT AND PLAN:  78-year-old male with seizures disorder who presents for evaluation  1  Nausea and vomiting  2  Chronic abdominal pain  3  Hematemesis with nausea  4  Melena  He reports chronic abdominal pain  More recently he has been having nausea, vomiting and presented to the emergency room with hematemesis  Hemoglobin at that time was normal   CT showed fatty liver but was overall unremarkable  He was prescribed PPI, Carafate after discharge but was unable to  the medications  Etiologies for his symptoms include esophagitis, peptic ulcer disease given his moderate NSAID use  He also has history of H pylori infection from gastric biopsies in 2017 is unsure if he completed treatment in etiologies also include persistent H pylori infection   I recommend he start omeprazole 40 mg daily to be taken 30 minutes before breakfast   He will also use Carafate as needed  Diagnostic EGD will be performed for evaluation given his hematemesis, melena, abdominal pain  I discussed the indication, risk and benefit of endoscopic evaluation with him today he is agreeable to proceed  - omeprazole (PriLOSEC) 40 MG capsule; Take 1 capsule (40 mg total) by mouth daily before breakfast  Dispense: 30 capsule; Refill: 3  - sucralfate (CARAFATE) 1 g/10 mL suspension; Take 10 mL (1 g total) by mouth 4 (four) times a day (with meals and at bedtime)  Dispense: 414 mL; Refill: 0      ______________________________________________________________________    HPI:  78-year-old male with seizures disorder who presents for evaluation  He reports chronic history of abdominal pain for a year  Most recently he has been having intermittent but daily left upper quadrant and left mid abdomen pain  This occurs throughout the day  It is not associated with eating    He has nausea and intermittent vomiting  He recently had episode of hematemesis with moderate volume bright red blood with his vomit for which he presented to the emergency room  He also reports dark tarry stools which are intermittent with loose stools  He denies bright red blood per rectum  He reports NSAID use of regular leave up until a few months ago  He previously underwent a endoscopy in 2017 with biopsies positive for H pylori but is unsure if he received treatment he    Reports family history of a great grandfather with colon cancer, maternal        He underwent CT abdomen pelvis showing hepatic steatosis otherwise normal   Labs showed lipase, liver enzymes within normal limits     2017 EGD pathology showed gastritis positive for H pylori  REVIEW OF SYSTEMS:    CONSTITUTIONAL: Denies any fever, chills, rigors, and weight loss  HEENT: No earache or tinnitus  Denies hearing loss or visual disturbances  CARDIOVASCULAR: No chest pain or palpitations  RESPIRATORY: Denies any cough, hemoptysis, shortness of breath or dyspnea on exertion  GASTROINTESTINAL: As noted in the History of Present Illness  GENITOURINARY: No problems with urination  Denies any hematuria or dysuria  NEUROLOGIC: No dizziness or vertigo, denies headaches  MUSCULOSKELETAL: Denies any muscle or joint pain  SKIN: Denies skin rashes or itching  ENDOCRINE: Denies excessive thirst  Denies intolerance to heat or cold  PSYCHOSOCIAL: Denies depression or anxiety  Denies any recent memory loss         Historical Information   Past Medical History:   Diagnosis Date   • Gastric ulcer    • GERD (gastroesophageal reflux disease)    • Hypertension    • Psychiatric disorder      Past Surgical History:   Procedure Laterality Date   • KNEE SURGERY Right 2016 & 2018    ACL   • KNEE SURGERY Left 2017    ACL   • SHOULDER SURGERY Right 2016    posterior labrum and rotator cuff repair     Social History   Social History     Substance and Sexual Activity   Alcohol Use Yes    Comment: 4 beers and 2 mixed drinks     Social History     Substance and Sexual Activity   Drug Use Yes    Comment: recovery      Social History     Tobacco Use   Smoking Status Every Day   • Packs/day: 0 50   • Types: Cigarettes   Smokeless Tobacco Current   • Types: Chew     Family History   Problem Relation Age of Onset   • No Known Problems Mother    • Heart disease Father    • Hypertension Father        Meds/Allergies       Current Outpatient Medications:   •  gabapentin (NEURONTIN) 800 mg tablet  •  modafinil (PROVIGIL) 100 mg tablet  •  Potassium (POTASSIMIN PO)  •  AFLURIA QUADRIVALENT 0 5 ML MARILEE  •  amitriptyline (ELAVIL) 100 mg tablet  •  dicyclomine (BENTYL) 20 mg tablet  •  lidocaine-prilocaine (EMLA) cream  •  losartan (COZAAR) 25 mg tablet  •  NARCAN 4 MG/0 1ML LIQD  •  nicotine (NICODERM CQ) 14 mg/24hr TD 24 hr patch  •  ondansetron (Zofran ODT) 4 mg disintegrating tablet  •  ondansetron (ZOFRAN-ODT) 4 mg disintegrating tablet  •  pantoprazole (PROTONIX) 40 mg tablet  •  prazosin (MINIPRESS) 1 mg capsule  •  propranolol (INDERAL) 10 mg tablet  •  QUEtiapine (SEROquel) 200 mg tablet  •  QUEtiapine (SEROquel) 400 MG tablet  •  sertraline (ZOLOFT) 100 mg tablet  •  sertraline (ZOLOFT) 50 mg tablet  •  sucralfate (CARAFATE) 1 g tablet    Allergies   Allergen Reactions   • Pine      Other reaction(s): Dermatologic Reaction           Objective     Blood pressure 146/82, height 5' 10" (1 778 m), weight 118 kg (259 lb 6 4 oz)  Body mass index is 37 22 kg/m²  PHYSICAL EXAM:      General Appearance:   Alert, cooperative, no distress   HEENT:   Normocephalic, atraumatic, anicteric  Neck:  Supple, symmetrical, trachea midline   Lungs:   Clear to auscultation bilaterally; no rales, rhonchi or wheezing; respirations unlabored    Heart[de-identified]   Regular rate and rhythm; no murmur, rub, or gallop     Abdomen:   Soft, generalized abdominal tenderness to deep palpation without rebound or guarding non-distended; normal bowel sounds; no masses, no organomegaly    Genitalia:   Deferred    Rectal:   Deferred    Extremities:  No cyanosis, clubbing or edema    Pulses:  2+ and symmetric    Skin:  No jaundice, rashes, or lesions    Lymph nodes:  No palpable cervical lymphadenopathy        Lab Results:   No visits with results within 1 Day(s) from this visit     Latest known visit with results is:   Admission on 11/08/2022, Discharged on 11/08/2022   Component Date Value   • Ventricular Rate 11/08/2022 130    • Atrial Rate 11/08/2022 130    • SC Interval 11/08/2022 132    • QRSD Interval 11/08/2022 82    • QT Interval 11/08/2022 294    • QTC Interval 11/08/2022 432    • P Axis 11/08/2022 55    • QRS Axis 11/08/2022 74    • T Wave Axis 11/08/2022 9    • WBC 11/08/2022 13 72 (H)    • RBC 11/08/2022 5 06    • Hemoglobin 11/08/2022 17 2 (H)    • Hematocrit 11/08/2022 50 4 (H)    • MCV 11/08/2022 100 (H)    • MCH 11/08/2022 34 0    • MCHC 11/08/2022 34 1    • RDW 11/08/2022 11 9    • MPV 11/08/2022 9 7    • Platelets 56/52/4253 326    • nRBC 11/08/2022 0    • Neutrophils Relative 11/08/2022 75    • Immat GRANS % 11/08/2022 0    • Lymphocytes Relative 11/08/2022 18    • Monocytes Relative 11/08/2022 7    • Eosinophils Relative 11/08/2022 0    • Basophils Relative 11/08/2022 0    • Neutrophils Absolute 11/08/2022 10 17 (H)    • Immature Grans Absolute 11/08/2022 0 04    • Lymphocytes Absolute 11/08/2022 2 45    • Monocytes Absolute 11/08/2022 1 00    • Eosinophils Absolute 11/08/2022 0 01    • Basophils Absolute 11/08/2022 0 05    • Sodium 11/08/2022 130 (L)    • Potassium 11/08/2022 5 0    • Chloride 11/08/2022 97    • CO2 11/08/2022 19 (L)    • ANION GAP 11/08/2022 14 (H)    • BUN 11/08/2022 18    • Creatinine 11/08/2022 0 78    • Glucose 11/08/2022 99    • Calcium 11/08/2022 9 6    • AST 11/08/2022 39    • ALT 11/08/2022 73    • Alkaline Phosphatase 11/08/2022 86    • Total Protein 11/08/2022 8 6 (H)    • Albumin 11/08/2022 4 0    • Total Bilirubin 11/08/2022 1 00    • eGFR 11/08/2022 121    • Lipase 11/08/2022 56 (L)    • Color, UA 11/08/2022 Yellow    • Clarity, UA 11/08/2022 Clear    • Specific Wallace, UA 11/08/2022 1 020    • pH, UA 11/08/2022 6 0    • Leukocytes, UA 11/08/2022 Negative    • Nitrite, UA 11/08/2022 Negative    • Protein, UA 11/08/2022 30 (1+) (A)    • Glucose, UA 11/08/2022 Negative    • Ketones, UA 11/08/2022 40 (2+) (A)    • Urobilinogen, UA 11/08/2022 <2 0    • Bilirubin, UA 11/08/2022 Negative    • Occult Blood, UA 11/08/2022 Negative    • LACTIC ACID 11/08/2022 0 9    • Protime 11/08/2022 13 4    • INR 11/08/2022 0 95    • ph, Tin ISTAT 11/08/2022 7 496 (HH)    • pCO2, Tin i-STAT 11/08/2022 30 9 (L)    • pO2, Tin i-STAT 11/08/2022 50 0 (H)    • BE, i-STAT 11/08/2022 2    • HCO3, Tin i-STAT 11/08/2022 23 9 (L)    • CO2, i-STAT 11/08/2022 25    • O2 Sat, i-STAT 11/08/2022 88 (H)    • SODIUM, I-STAT 11/08/2022 134 (L)    • Potassium, i-STAT 11/08/2022 3 7    • Calcium, Ionized i-STAT 11/08/2022 1 08 (L)    • Hct, i-STAT 11/08/2022 48    • Hgb, i-STAT 11/08/2022 16 3    • Glucose, i-STAT 11/08/2022 95    • Specimen Type 11/08/2022 VENOUS    • ph, Tin ISTAT 11/08/2022 7 496 (HH)    • pCO2, Tin i-STAT 11/08/2022 30 9 (L)    • pO2, Tin i-STAT 11/08/2022 50 0 (H)    • BE, i-STAT 11/08/2022 2    • HCO3, Tin i-STAT 11/08/2022 23 9 (L)    • CO2, i-STAT 11/08/2022 25    • O2 Sat, i-STAT 11/08/2022 88 (H)    • SODIUM, I-STAT 11/08/2022 134 (L)    • Potassium, i-STAT 11/08/2022 3 7    • Calcium, Ionized i-STAT 11/08/2022 1 08 (L)    • Hct, i-STAT 11/08/2022 48    • Hgb, i-STAT 11/08/2022 16 3    • Glucose, i-STAT 11/08/2022 95    • Specimen Type 11/08/2022 VENOUS    • RBC, UA 11/08/2022 1-2    • WBC, UA 11/08/2022 1-2    • Epithelial Cells 11/08/2022 Occasional    • Bacteria, UA 11/08/2022 Occasional    • Sodium 11/08/2022 135    • Potassium 11/08/2022 3 5    • Chloride 11/08/2022 100    • CO2 11/08/2022 27    • ANION GAP 11/08/2022 8    • BUN 11/08/2022 14    • Creatinine 11/08/2022 0 94    • Glucose 11/08/2022 99    • Calcium 11/08/2022 8 5    • eGFR 11/08/2022 109          Radiology Results:   XR chest portable    Result Date: 11/8/2022  Narrative: CHEST INDICATION:   vomitting blood  COMPARISON:  None EXAM PERFORMED/VIEWS:  XR CHEST PORTABLE FINDINGS: Cardiomediastinal silhouette appears unremarkable  The lungs are clear  No pneumothorax or pleural effusion  Osseous structures appear within normal limits for patient age  Impression: No acute cardiopulmonary disease  Workstation performed: [id number]     CT abdomen pelvis with contrast    Result Date: 11/8/2022  Narrative: CT ABDOMEN AND PELVIS WITH IV CONTRAST INDICATION:   R/O nephrolithiasis / mass  COMPARISON:  3/19/2021 TECHNIQUE:  CT examination of the abdomen and pelvis was performed  Axial, sagittal, and coronal 2D reformatted images were created from the source data and submitted for interpretation  Radiation dose length product (DLP) for this visit:  975 74 mGy-cm   This examination, like all CT scans performed in the [facility], was performed utilizing techniques to minimize radiation dose exposure, including the use of iterative  reconstruction and automated exposure control  IV Contrast:  100 mL of iohexol (OMNIPAQUE) Enteric Contrast:  Enteric contrast was not administered  FINDINGS: ABDOMEN LOWER CHEST:  No clinically significant abnormality identified in the visualized lower chest  LIVER/BILIARY TREE:  Liver is diffusely decreased in density consistent with fatty change  No CT evidence of suspicious hepatic mass  Normal hepatic contours  No biliary dilatation  GALLBLADDER:  No calcified gallstones  No pericholecystic inflammatory change  SPLEEN:  Unremarkable  PANCREAS:  Unremarkable  ADRENAL GLANDS:  Unremarkable  KIDNEYS/URETERS:  Unremarkable  No hydronephrosis  STOMACH AND BOWEL:  Unremarkable   APPENDIX:  A normal appendix was visualized  ABDOMINOPELVIC CAVITY:  No ascites  No pneumoperitoneum  No lymphadenopathy  VESSELS:  Unremarkable for patient's age  PELVIS REPRODUCTIVE ORGANS:  Unremarkable for patient's age  URINARY BLADDER:  Unremarkable  ABDOMINAL WALL/INGUINAL REGIONS:  Unremarkable  OSSEOUS STRUCTURES:  No acute fracture or destructive osseous lesion  Impression: No acute intra-abdominal abnormality  Hepatic steatosis   Workstation performed: [id number]

## 2022-11-30 NOTE — PATIENT INSTRUCTIONS
Scheduled date of EGD (as of today) 12/15/22  Physician performing: Dr Domi Wallaceh  Location of procedure:  [facility]  Instructions given to patient:  zora  Clearances: zora

## 2022-11-30 NOTE — PROGRESS NOTES
[facility] - Outpatient Consultation  Susan Aranda 32 y o  male MRN: [id number]  Encounter: [id number]          ASSESSMENT AND PLAN:  24-year-old male with seizures disorder who presents for evaluation  1  Nausea and vomiting  2  Chronic abdominal pain  3  Hematemesis with nausea  4  Melena  He reports chronic abdominal pain  More recently he has been having nausea, vomiting and presented to the emergency room with hematemesis  Hemoglobin at that time was normal   CT showed fatty liver but was overall unremarkable  He was prescribed PPI, Carafate after discharge but was unable to  the medications  Etiologies for his symptoms include esophagitis, peptic ulcer disease given his moderate NSAID use  He also has history of H pylori infection from gastric biopsies in 2017 is unsure if he completed treatment in etiologies also include persistent H pylori infection   I recommend he start omeprazole 40 mg daily to be taken 30 minutes before breakfast   He will also use Carafate as needed  Diagnostic EGD will be performed for evaluation given his hematemesis, melena, abdominal pain  I discussed the indication, risk and benefit of endoscopic evaluation with him today he is agreeable to proceed  - omeprazole (PriLOSEC) 40 MG capsule; Take 1 capsule (40 mg total) by mouth daily before breakfast  Dispense: 30 capsule; Refill: 3  - sucralfate (CARAFATE) 1 g/10 mL suspension; Take 10 mL (1 g total) by mouth 4 (four) times a day (with meals and at bedtime)  Dispense: 414 mL; Refill: 0      ______________________________________________________________________    HPI:  24-year-old male with seizures disorder who presents for evaluation  He reports chronic history of abdominal pain for a year  Most recently he has been having intermittent but daily left upper quadrant and left mid abdomen pain  This occurs throughout the day  It is not associated with eating    He has nausea and intermittent vomiting  He recently had episode of hematemesis with moderate volume bright red blood with his vomit for which he presented to the emergency room  He also reports dark tarry stools which are intermittent with loose stools  He denies bright red blood per rectum  He reports NSAID use of regular leave up until a few months ago  He previously underwent a endoscopy in 2017 with biopsies positive for H pylori but is unsure if he received treatment he    Reports family history of a great grandfather with colon cancer, maternal        He underwent CT abdomen pelvis showing hepatic steatosis otherwise normal   Labs showed lipase, liver enzymes within normal limits     2017 EGD pathology showed gastritis positive for H pylori  REVIEW OF SYSTEMS:    CONSTITUTIONAL: Denies any fever, chills, rigors, and weight loss  HEENT: No earache or tinnitus  Denies hearing loss or visual disturbances  CARDIOVASCULAR: No chest pain or palpitations  RESPIRATORY: Denies any cough, hemoptysis, shortness of breath or dyspnea on exertion  GASTROINTESTINAL: As noted in the History of Present Illness  GENITOURINARY: No problems with urination  Denies any hematuria or dysuria  NEUROLOGIC: No dizziness or vertigo, denies headaches  MUSCULOSKELETAL: Denies any muscle or joint pain  SKIN: Denies skin rashes or itching  ENDOCRINE: Denies excessive thirst  Denies intolerance to heat or cold  PSYCHOSOCIAL: Denies depression or anxiety  Denies any recent memory loss         Historical Information   Past Medical History:   Diagnosis Date   • Gastric ulcer    • GERD (gastroesophageal reflux disease)    • Hypertension    • Psychiatric disorder      Past Surgical History:   Procedure Laterality Date   • KNEE SURGERY Right 2016 & 2018    ACL   • KNEE SURGERY Left 2017    ACL   • SHOULDER SURGERY Right 2016    posterior labrum and rotator cuff repair     Social History   Social History     Substance and Sexual Activity   Alcohol Use Yes    Comment: 4 beers and 2 mixed drinks     Social History     Substance and Sexual Activity   Drug Use Yes    Comment: recovery      Social History     Tobacco Use   Smoking Status Every Day   • Packs/day: 0 50   • Types: Cigarettes   Smokeless Tobacco Current   • Types: Chew     Family History   Problem Relation Age of Onset   • No Known Problems Mother    • Heart disease Father    • Hypertension Father        Meds/Allergies       Current Outpatient Medications:   •  gabapentin (NEURONTIN) 800 mg tablet  •  modafinil (PROVIGIL) 100 mg tablet  •  Potassium (POTASSIMIN PO)  •  AFLURIA QUADRIVALENT 0 5 ML MARILEE  •  amitriptyline (ELAVIL) 100 mg tablet  •  dicyclomine (BENTYL) 20 mg tablet  •  lidocaine-prilocaine (EMLA) cream  •  losartan (COZAAR) 25 mg tablet  •  NARCAN 4 MG/0 1ML LIQD  •  nicotine (NICODERM CQ) 14 mg/24hr TD 24 hr patch  •  ondansetron (Zofran ODT) 4 mg disintegrating tablet  •  ondansetron (ZOFRAN-ODT) 4 mg disintegrating tablet  •  pantoprazole (PROTONIX) 40 mg tablet  •  prazosin (MINIPRESS) 1 mg capsule  •  propranolol (INDERAL) 10 mg tablet  •  QUEtiapine (SEROquel) 200 mg tablet  •  QUEtiapine (SEROquel) 400 MG tablet  •  sertraline (ZOLOFT) 100 mg tablet  •  sertraline (ZOLOFT) 50 mg tablet  •  sucralfate (CARAFATE) 1 g tablet    Allergies   Allergen Reactions   • Pine      Other reaction(s): Dermatologic Reaction           Objective     Blood pressure 146/82, height 5' 10" (1 778 m), weight 118 kg (259 lb 6 4 oz)  Body mass index is 37 22 kg/m²  PHYSICAL EXAM:      General Appearance:   Alert, cooperative, no distress   HEENT:   Normocephalic, atraumatic, anicteric  Neck:  Supple, symmetrical, trachea midline   Lungs:   Clear to auscultation bilaterally; no rales, rhonchi or wheezing; respirations unlabored    Heart[de-identified]   Regular rate and rhythm; no murmur, rub, or gallop     Abdomen:   Soft, generalized abdominal tenderness to deep palpation without rebound or guarding non-distended; normal bowel sounds; no masses, no organomegaly    Genitalia:   Deferred    Rectal:   Deferred    Extremities:  No cyanosis, clubbing or edema    Pulses:  2+ and symmetric    Skin:  No jaundice, rashes, or lesions    Lymph nodes:  No palpable cervical lymphadenopathy        Lab Results:   No visits with results within 1 Day(s) from this visit     Latest known visit with results is:   Admission on 11/08/2022, Discharged on 11/08/2022   Component Date Value   • Ventricular Rate 11/08/2022 130    • Atrial Rate 11/08/2022 130    • RI Interval 11/08/2022 132    • QRSD Interval 11/08/2022 82    • QT Interval 11/08/2022 294    • QTC Interval 11/08/2022 432    • P Axis 11/08/2022 55    • QRS Axis 11/08/2022 74    • T Wave Axis 11/08/2022 9    • WBC 11/08/2022 13 72 (H)    • RBC 11/08/2022 5 06    • Hemoglobin 11/08/2022 17 2 (H)    • Hematocrit 11/08/2022 50 4 (H)    • MCV 11/08/2022 100 (H)    • MCH 11/08/2022 34 0    • MCHC 11/08/2022 34 1    • RDW 11/08/2022 11 9    • MPV 11/08/2022 9 7    • Platelets 54/27/1294 326    • nRBC 11/08/2022 0    • Neutrophils Relative 11/08/2022 75    • Immat GRANS % 11/08/2022 0    • Lymphocytes Relative 11/08/2022 18    • Monocytes Relative 11/08/2022 7    • Eosinophils Relative 11/08/2022 0    • Basophils Relative 11/08/2022 0    • Neutrophils Absolute 11/08/2022 10 17 (H)    • Immature Grans Absolute 11/08/2022 0 04    • Lymphocytes Absolute 11/08/2022 2 45    • Monocytes Absolute 11/08/2022 1 00    • Eosinophils Absolute 11/08/2022 0 01    • Basophils Absolute 11/08/2022 0 05    • Sodium 11/08/2022 130 (L)    • Potassium 11/08/2022 5 0    • Chloride 11/08/2022 97    • CO2 11/08/2022 19 (L)    • ANION GAP 11/08/2022 14 (H)    • BUN 11/08/2022 18    • Creatinine 11/08/2022 0 78    • Glucose 11/08/2022 99    • Calcium 11/08/2022 9 6    • AST 11/08/2022 39    • ALT 11/08/2022 73    • Alkaline Phosphatase 11/08/2022 86    • Total Protein 11/08/2022 8 6 (H)    • Albumin 11/08/2022 4 0    • Total Bilirubin 11/08/2022 1 00    • eGFR 11/08/2022 121    • Lipase 11/08/2022 56 (L)    • Color, UA 11/08/2022 Yellow    • Clarity, UA 11/08/2022 Clear    • Specific Smoot, UA 11/08/2022 1 020    • pH, UA 11/08/2022 6 0    • Leukocytes, UA 11/08/2022 Negative    • Nitrite, UA 11/08/2022 Negative    • Protein, UA 11/08/2022 30 (1+) (A)    • Glucose, UA 11/08/2022 Negative    • Ketones, UA 11/08/2022 40 (2+) (A)    • Urobilinogen, UA 11/08/2022 <2 0    • Bilirubin, UA 11/08/2022 Negative    • Occult Blood, UA 11/08/2022 Negative    • LACTIC ACID 11/08/2022 0 9    • Protime 11/08/2022 13 4    • INR 11/08/2022 0 95    • ph, Tin ISTAT 11/08/2022 7 496 (HH)    • pCO2, Tin i-STAT 11/08/2022 30 9 (L)    • pO2, Tin i-STAT 11/08/2022 50 0 (H)    • BE, i-STAT 11/08/2022 2    • HCO3, Tin i-STAT 11/08/2022 23 9 (L)    • CO2, i-STAT 11/08/2022 25    • O2 Sat, i-STAT 11/08/2022 88 (H)    • SODIUM, I-STAT 11/08/2022 134 (L)    • Potassium, i-STAT 11/08/2022 3 7    • Calcium, Ionized i-STAT 11/08/2022 1 08 (L)    • Hct, i-STAT 11/08/2022 48    • Hgb, i-STAT 11/08/2022 16 3    • Glucose, i-STAT 11/08/2022 95    • Specimen Type 11/08/2022 VENOUS    • ph, Tin ISTAT 11/08/2022 7 496 (HH)    • pCO2, Tin i-STAT 11/08/2022 30 9 (L)    • pO2, Tin i-STAT 11/08/2022 50 0 (H)    • BE, i-STAT 11/08/2022 2    • HCO3, Tin i-STAT 11/08/2022 23 9 (L)    • CO2, i-STAT 11/08/2022 25    • O2 Sat, i-STAT 11/08/2022 88 (H)    • SODIUM, I-STAT 11/08/2022 134 (L)    • Potassium, i-STAT 11/08/2022 3 7    • Calcium, Ionized i-STAT 11/08/2022 1 08 (L)    • Hct, i-STAT 11/08/2022 48    • Hgb, i-STAT 11/08/2022 16 3    • Glucose, i-STAT 11/08/2022 95    • Specimen Type 11/08/2022 VENOUS    • RBC, UA 11/08/2022 1-2    • WBC, UA 11/08/2022 1-2    • Epithelial Cells 11/08/2022 Occasional    • Bacteria, UA 11/08/2022 Occasional    • Sodium 11/08/2022 135    • Potassium 11/08/2022 3 5    • Chloride 11/08/2022 100    • CO2 11/08/2022 27    • ANION GAP 11/08/2022 8    • BUN 11/08/2022 14    • Creatinine 11/08/2022 0 94    • Glucose 11/08/2022 99    • Calcium 11/08/2022 8 5    • eGFR 11/08/2022 109          Radiology Results:   XR chest portable    Result Date: 11/8/2022  Narrative: CHEST INDICATION:   vomitting blood  COMPARISON:  None EXAM PERFORMED/VIEWS:  XR CHEST PORTABLE FINDINGS: Cardiomediastinal silhouette appears unremarkable  The lungs are clear  No pneumothorax or pleural effusion  Osseous structures appear within normal limits for patient age  Impression: No acute cardiopulmonary disease  Workstation performed: [id number]     CT abdomen pelvis with contrast    Result Date: 11/8/2022  Narrative: CT ABDOMEN AND PELVIS WITH IV CONTRAST INDICATION:   R/O nephrolithiasis / mass  COMPARISON:  3/19/2021 TECHNIQUE:  CT examination of the abdomen and pelvis was performed  Axial, sagittal, and coronal 2D reformatted images were created from the source data and submitted for interpretation  Radiation dose length product (DLP) for this visit:  975 74 mGy-cm   This examination, like all CT scans performed in the [facility], was performed utilizing techniques to minimize radiation dose exposure, including the use of iterative  reconstruction and automated exposure control  IV Contrast:  100 mL of iohexol (OMNIPAQUE) Enteric Contrast:  Enteric contrast was not administered  FINDINGS: ABDOMEN LOWER CHEST:  No clinically significant abnormality identified in the visualized lower chest  LIVER/BILIARY TREE:  Liver is diffusely decreased in density consistent with fatty change  No CT evidence of suspicious hepatic mass  Normal hepatic contours  No biliary dilatation  GALLBLADDER:  No calcified gallstones  No pericholecystic inflammatory change  SPLEEN:  Unremarkable  PANCREAS:  Unremarkable  ADRENAL GLANDS:  Unremarkable  KIDNEYS/URETERS:  Unremarkable  No hydronephrosis  STOMACH AND BOWEL:  Unremarkable   APPENDIX:  A normal appendix was visualized  ABDOMINOPELVIC CAVITY:  No ascites  No pneumoperitoneum  No lymphadenopathy  VESSELS:  Unremarkable for patient's age  PELVIS REPRODUCTIVE ORGANS:  Unremarkable for patient's age  URINARY BLADDER:  Unremarkable  ABDOMINAL WALL/INGUINAL REGIONS:  Unremarkable  OSSEOUS STRUCTURES:  No acute fracture or destructive osseous lesion  Impression: No acute intra-abdominal abnormality  Hepatic steatosis   Workstation performed: [id number]

## 2022-12-14 RX ORDER — SODIUM CHLORIDE 9 MG/ML
125 INJECTION, SOLUTION INTRAVENOUS CONTINUOUS
Status: CANCELLED | OUTPATIENT
Start: 2022-12-14

## 2022-12-15 ENCOUNTER — HOSPITAL ENCOUNTER (OUTPATIENT)
Dept: GASTROENTEROLOGY | Facility: HOSPITAL | Age: 27
Setting detail: OUTPATIENT SURGERY
End: 2022-12-15
Attending: INTERNAL MEDICINE

## 2022-12-15 ENCOUNTER — ANESTHESIA (OUTPATIENT)
Dept: GASTROENTEROLOGY | Facility: HOSPITAL | Age: 27
End: 2022-12-15

## 2022-12-15 ENCOUNTER — ANESTHESIA EVENT (OUTPATIENT)
Dept: GASTROENTEROLOGY | Facility: HOSPITAL | Age: 27
End: 2022-12-15

## 2022-12-15 VITALS
WEIGHT: 259 LBS | SYSTOLIC BLOOD PRESSURE: 124 MMHG | TEMPERATURE: 97.9 F | HEART RATE: 92 BPM | RESPIRATION RATE: 20 BRPM | HEIGHT: 70 IN | OXYGEN SATURATION: 98 % | BODY MASS INDEX: 37.08 KG/M2 | DIASTOLIC BLOOD PRESSURE: 65 MMHG

## 2022-12-15 DIAGNOSIS — R11.2 NAUSEA AND VOMITING: ICD-10-CM

## 2022-12-15 DIAGNOSIS — K92.0 HEMATEMESIS WITH NAUSEA: ICD-10-CM

## 2022-12-15 PROBLEM — F32.A DEPRESSION: Status: ACTIVE | Noted: 2022-12-15

## 2022-12-15 PROBLEM — E66.9 OBESITY, CLASS II, BMI 35-39.9: Status: ACTIVE | Noted: 2022-12-15

## 2022-12-15 PROBLEM — K21.9 GASTROESOPHAGEAL REFLUX DISEASE: Status: ACTIVE | Noted: 2022-12-15

## 2022-12-15 PROBLEM — E66.812 OBESITY, CLASS II, BMI 35-39.9: Status: ACTIVE | Noted: 2022-12-15

## 2022-12-15 RX ORDER — PROPOFOL 10 MG/ML
INJECTION, EMULSION INTRAVENOUS AS NEEDED
Status: DISCONTINUED | OUTPATIENT
Start: 2022-12-15 | End: 2022-12-15

## 2022-12-15 RX ORDER — LIDOCAINE HYDROCHLORIDE 20 MG/ML
INJECTION, SOLUTION EPIDURAL; INFILTRATION; INTRACAUDAL; PERINEURAL AS NEEDED
Status: DISCONTINUED | OUTPATIENT
Start: 2022-12-15 | End: 2022-12-15

## 2022-12-15 RX ORDER — SODIUM CHLORIDE 9 MG/ML
125 INJECTION, SOLUTION INTRAVENOUS CONTINUOUS
Status: DISCONTINUED | OUTPATIENT
Start: 2022-12-15 | End: 2022-12-19 | Stop reason: HOSPADM

## 2022-12-15 RX ORDER — OMEPRAZOLE 40 MG/1
CAPSULE, DELAYED RELEASE ORAL
Qty: 180 CAPSULE | Refills: 0 | Status: SHIPPED | OUTPATIENT
Start: 2022-12-15

## 2022-12-15 RX ADMIN — SODIUM CHLORIDE 125 ML/HR: 0.9 INJECTION, SOLUTION INTRAVENOUS at 10:21

## 2022-12-15 RX ADMIN — PROPOFOL 50 MG: 10 INJECTION, EMULSION INTRAVENOUS at 10:53

## 2022-12-15 RX ADMIN — PROPOFOL 50 MG: 10 INJECTION, EMULSION INTRAVENOUS at 10:49

## 2022-12-15 RX ADMIN — PROPOFOL 50 MG: 10 INJECTION, EMULSION INTRAVENOUS at 10:48

## 2022-12-15 RX ADMIN — LIDOCAINE HYDROCHLORIDE 100 MG: 20 INJECTION, SOLUTION EPIDURAL; INFILTRATION; INTRACAUDAL at 10:47

## 2022-12-15 RX ADMIN — PROPOFOL 200 MG: 10 INJECTION, EMULSION INTRAVENOUS at 10:47

## 2022-12-15 RX ADMIN — PROPOFOL 50 MG: 10 INJECTION, EMULSION INTRAVENOUS at 10:51

## 2022-12-15 RX ADMIN — PROPOFOL 50 MG: 10 INJECTION, EMULSION INTRAVENOUS at 10:55

## 2022-12-15 NOTE — ANESTHESIA PREPROCEDURE EVALUATION
Procedure:  EGD    Relevant Problems   GI/HEPATIC   (+) Gastroesophageal reflux disease      NEURO/PSYCH   (+) Depression      Other   (+) Obesity, Class II, BMI 35-39 9        Physical Exam    Airway    Mallampati score: II  TM Distance: >3 FB  Neck ROM: full     Dental   No notable dental hx     Cardiovascular  Rhythm: regular, Rate: normal, Cardiovascular exam normal    Pulmonary  Pulmonary exam normal Breath sounds clear to auscultation,     Other Findings        Anesthesia Plan  ASA Score- 2     Anesthesia Type- IV sedation with anesthesia with ASA Monitors  Additional Monitors:   Airway Plan:           Plan Factors-Exercise tolerance (METS): >4 METS  Chart reviewed  Patient summary reviewed  Patient is a current smoker (1/2 ppd)  Patient instructed to abstain from smoking on day of procedure  Patient smoked on day of surgery  Induction- intravenous  Postoperative Plan-     Informed Consent- Anesthetic plan and risks discussed with patient

## 2022-12-15 NOTE — INTERVAL H&P NOTE
H&P reviewed  After examining the patient I find no changes in the patients condition since the H&P had been written      Vitals:    12/15/22 1000   BP: 162/92   Pulse: 82   Resp: 17   Temp: 97 9 °F (36 6 °C)   SpO2: 100%

## 2022-12-16 NOTE — ANESTHESIA POSTPROCEDURE EVALUATION
Post-Op Assessment Note    CV Status:  Stable    Pain management: adequate     Mental Status:  Alert and awake   Hydration Status:  Euvolemic   PONV Controlled:  Controlled   Airway Patency:  Patent      Post Op Vitals Reviewed: Yes      Staff: Anesthesiologist         No notable events documented      BP      Temp      Pulse     Resp      SpO2      /65   Pulse 92   Temp 97 9 °F (36 6 °C) (Temporal)   Resp 20   Ht 5' 10" (1 778 m)   Wt 117 kg (259 lb)   SpO2 98%   BMI 37 16 kg/m²

## 2022-12-20 NOTE — RESULT ENCOUNTER NOTE
Please let him know that the biopsies we took did not show infection or celiac disease  He had small ulcers in the stomach and should continue to take omeprazole  He should follow up with Dr Jaspreet Lima in January as scheduled  He will likely need another EGD in 12 weeks  Thanks    -VT

## 2023-11-13 ENCOUNTER — APPOINTMENT (OUTPATIENT)
Dept: RADIOLOGY | Facility: CLINIC | Age: 28
End: 2023-11-13
Payer: COMMERCIAL

## 2023-11-13 ENCOUNTER — TRANSCRIBE ORDERS (OUTPATIENT)
Dept: URGENT CARE | Facility: CLINIC | Age: 28
End: 2023-11-13

## 2023-11-13 DIAGNOSIS — M84.376G: Primary | ICD-10-CM

## 2023-11-13 DIAGNOSIS — M84.376G: ICD-10-CM

## 2023-11-13 DIAGNOSIS — M54.50 BILATERAL LOW BACK PAIN, UNSPECIFIED CHRONICITY, UNSPECIFIED WHETHER SCIATICA PRESENT: ICD-10-CM

## 2023-11-13 PROCEDURE — 73620 X-RAY EXAM OF FOOT: CPT

## 2023-11-13 PROCEDURE — 72100 X-RAY EXAM L-S SPINE 2/3 VWS: CPT

## 2023-11-14 ENCOUNTER — APPOINTMENT (OUTPATIENT)
Dept: LAB | Facility: HOSPITAL | Age: 28
End: 2023-11-14
Payer: COMMERCIAL

## 2023-11-14 DIAGNOSIS — M79.671 PAIN IN RIGHT FOOT: ICD-10-CM

## 2023-11-14 LAB
ALBUMIN SERPL BCP-MCNC: 4.4 G/DL (ref 3.5–5)
ALP SERPL-CCNC: 80 U/L (ref 34–104)
ALT SERPL W P-5'-P-CCNC: 29 U/L (ref 7–52)
ANA SER QL IA: NEGATIVE
ANION GAP SERPL CALCULATED.3IONS-SCNC: 12 MMOL/L
AST SERPL W P-5'-P-CCNC: 28 U/L (ref 13–39)
BILIRUB SERPL-MCNC: 0.52 MG/DL (ref 0.2–1)
BUN SERPL-MCNC: 15 MG/DL (ref 5–25)
CALCIUM SERPL-MCNC: 9.2 MG/DL (ref 8.4–10.2)
CHLORIDE SERPL-SCNC: 103 MMOL/L (ref 96–108)
CO2 SERPL-SCNC: 22 MMOL/L (ref 21–32)
CREAT SERPL-MCNC: 0.94 MG/DL (ref 0.6–1.3)
CRP SERPL QL: 3.4 MG/L
ERYTHROCYTE [SEDIMENTATION RATE] IN BLOOD: 2 MM/HOUR (ref 0–14)
GFR SERPL CREATININE-BSD FRML MDRD: 108 ML/MIN/1.73SQ M
GLUCOSE SERPL-MCNC: 77 MG/DL (ref 65–140)
POTASSIUM SERPL-SCNC: 3.7 MMOL/L (ref 3.5–5.3)
PROT SERPL-MCNC: 7.2 G/DL (ref 6.4–8.4)
SODIUM SERPL-SCNC: 137 MMOL/L (ref 135–147)

## 2023-11-14 PROCEDURE — 86140 C-REACTIVE PROTEIN: CPT

## 2023-11-14 PROCEDURE — 85652 RBC SED RATE AUTOMATED: CPT

## 2023-11-14 PROCEDURE — 86038 ANTINUCLEAR ANTIBODIES: CPT

## 2023-11-14 PROCEDURE — 80053 COMPREHEN METABOLIC PANEL: CPT

## 2023-11-14 PROCEDURE — 36415 COLL VENOUS BLD VENIPUNCTURE: CPT

## 2023-11-14 PROCEDURE — 86430 RHEUMATOID FACTOR TEST QUAL: CPT

## 2023-11-15 LAB — RHEUMATOID FACT SER QL LA: NEGATIVE

## 2023-11-27 ENCOUNTER — TELEPHONE (OUTPATIENT)
Age: 28
End: 2023-11-27

## 2023-11-27 NOTE — TELEPHONE ENCOUNTER
Hello,  Please advise if the following patient can be forced onto the schedule:    Patient: Davonte Lambert     : 1995    MRN: [id number]    Call back #: [phone]    Insurance: HBS     Reason for appointment: Right foot fracture     Requested doctor/location: [facility]      Thank you.

## 2023-11-30 ENCOUNTER — OFFICE VISIT (OUTPATIENT)
Dept: PODIATRY | Facility: CLINIC | Age: 28
End: 2023-11-30
Payer: COMMERCIAL

## 2023-11-30 VITALS
WEIGHT: 259 LBS | BODY MASS INDEX: 37.08 KG/M2 | SYSTOLIC BLOOD PRESSURE: 146 MMHG | HEART RATE: 108 BPM | HEIGHT: 70 IN | DIASTOLIC BLOOD PRESSURE: 89 MMHG

## 2023-11-30 DIAGNOSIS — S92.334G: Primary | ICD-10-CM

## 2023-11-30 PROBLEM — S92.334A CLOSED NONDISPLACED FRACTURE OF THIRD METATARSAL BONE OF RIGHT FOOT, INITIAL ENCOUNTER: Status: ACTIVE | Noted: 2023-11-30

## 2023-11-30 PROCEDURE — 99203 OFFICE O/P NEW LOW 30 MIN: CPT | Performed by: PODIATRIST

## 2023-11-30 NOTE — PROGRESS NOTES
Assessment/Plan:    X-rays from 11/13/2023 personally reviewed with patient in detail. Fracture proximal one third third metatarsal transverse in nature with bone callus and evidence of healing. Fracture line still visible with injury date sometime in June 2023. Recommend cam boot immobilization for the next 6-8 weeks with repeat x-ray to evaluate further healing. At this point 6 months since injury this is not a nonunion but is certainly in a delayed union state. This most likely is a result of him not being immobilized or diagnosed early enough and his continued ambulation without restriction and regular shoe gear. Rx CAM boot  Lifting restrictions at work to 20-25 pounds maximum, per patient's request he must continue to work and states his employer will allow him to work with cam boot immobilization. Patient encouraged to elevate and limit his activities to a minimum for the next several weeks to help facilitate healing. Rx x-ray right foot to be completed on day of next follow-up visit prior to evaluation. Follow-up in 6-8 weeks. No problem-specific Assessment & Plan notes found for this encounter. Diagnoses and all orders for this visit:    Closed nondisplaced fracture of third metatarsal bone of right foot with delayed healing  -     Cam Boot  -     X-ray foot right 3+ views; Future          Subjective:      Patient ID: Festus Gamboa is a 25 y.o. male. 11/30/2023: 41-year-old male presents with fracture right foot for evaluation and continued care. Reports back in June and injured his right foot and never had it x-rayed but the foot failed to progress. Patient saw his primary care in July and thought it was just a sprain, since that point he had consistent pain daily and most recently saw his primary care again who ordered an x-ray which was approximately 2 weeks ago and he was diagnosed with a fracture right third metatarsal.  Is any new pain/discomfort.         The following portions of the patient's history were reviewed and updated as appropriate: allergies, current medications, past family history, past medical history, past social history, past surgical history, and problem list.    Review of Systems   Constitutional: Negative. HENT: Negative. Eyes: Negative. Respiratory: Negative. Cardiovascular: Negative. Gastrointestinal: Negative. Endocrine: Negative. Genitourinary: Negative. Musculoskeletal:         Painful right foot with diagnosed fracture third metatarsal   Skin: Negative. Allergic/Immunologic: Negative. Neurological: Negative. Hematological: Negative. Psychiatric/Behavioral: Negative. Objective:      /89   Pulse (!) 108   Ht 5' 10" (1.778 m)   Wt 117 kg (259 lb)   BMI 37.16 kg/m²          Physical Exam  Constitutional:       Appearance: Normal appearance. HENT:      Head: Normocephalic. Right Ear: Tympanic membrane normal.      Left Ear: Tympanic membrane normal.      Nose: No congestion. Mouth/Throat:      Pharynx: No oropharyngeal exudate or posterior oropharyngeal erythema. Eyes:      Conjunctiva/sclera: Conjunctivae normal.      Pupils: Pupils are equal, round, and reactive to light. Cardiovascular:      Rate and Rhythm: Normal rate and regular rhythm. Pulses: Normal pulses. Pulmonary:      Effort: Pulmonary effort is normal.   Musculoskeletal:         General: Swelling, tenderness and signs of injury present. Comments: Moderate pain on palpation of right third metatarsal shaft especially at the proximal one third. Localized edema overlying this area noted. Feet:      Right foot:      Protective Sensation: 10 sites tested. 10 sites sensed. Left foot:      Protective Sensation: 10 sites tested. 10 sites sensed. Skin:     General: Skin is warm and dry. Capillary Refill: Capillary refill takes 2 to 3 seconds. Coloration: Skin is not pale.       Findings: No bruising, erythema, lesion or rash. Neurological:      General: No focal deficit present. Mental Status: He is alert. Cranial Nerves: No cranial nerve deficit. Sensory: No sensory deficit. Motor: No weakness.       Gait: Gait normal.      Deep Tendon Reflexes: Reflexes normal.   Psychiatric:         Mood and Affect: Mood normal.         Behavior: Behavior normal.         Judgment: Judgment normal.

## 2023-11-30 NOTE — LETTER
November 30, 2023     Patient: Deb Ugarte  YOB: 1995  Date of Visit: 11/30/2023      To Whom it May Concern:    Deb Ugarte is under my professional care. Brandon Dean was seen in my office on 11/30/2023. Brandon Dean may return to work with limitations . Please limit lifting to 25 pounds maximum patient for the next 6 weeks. Patient has fracture right foot with delayed healing. Please allow patient to work wearing cam boot for immobilization . If you have any questions or concerns, please don't hesitate to call.          Sincerely,          Disha Hurtado DPM        CC: No Recipients

## 2023-12-12 ENCOUNTER — OFFICE VISIT (OUTPATIENT)
Dept: PAIN MEDICINE | Facility: CLINIC | Age: 28
End: 2023-12-12
Payer: COMMERCIAL

## 2023-12-12 VITALS
TEMPERATURE: 99 F | HEART RATE: 127 BPM | HEIGHT: 70 IN | WEIGHT: 277 LBS | BODY MASS INDEX: 39.65 KG/M2 | DIASTOLIC BLOOD PRESSURE: 84 MMHG | SYSTOLIC BLOOD PRESSURE: 140 MMHG

## 2023-12-12 DIAGNOSIS — M54.16 RIGHT LUMBAR RADICULOPATHY: ICD-10-CM

## 2023-12-12 DIAGNOSIS — M47.816 LUMBAR SPONDYLOSIS: Primary | ICD-10-CM

## 2023-12-12 DIAGNOSIS — M43.17 SPONDYLOLISTHESIS AT L5-S1 LEVEL: ICD-10-CM

## 2023-12-12 PROBLEM — F10.929 ACUTE ALCOHOLIC INTOXICATION WITH COMPLICATION (HCC): Status: ACTIVE | Noted: 2017-06-23

## 2023-12-12 PROBLEM — R45.851 SUICIDAL IDEATION: Status: ACTIVE | Noted: 2017-06-23

## 2023-12-12 PROCEDURE — 99204 OFFICE O/P NEW MOD 45 MIN: CPT | Performed by: ANESTHESIOLOGY

## 2023-12-12 RX ORDER — ARMODAFINIL 250 MG/1
250 TABLET ORAL DAILY
COMMUNITY
Start: 2023-10-31

## 2023-12-12 NOTE — PROGRESS NOTES
Assessment  1. Lumbar spondylosis    2. Right lumbar radiculopathy    3. Spondylolisthesis at L5-S1 level        Plan      At this point the patient's pain persists despite time, relative rest, activity modification, and nonsteroidal anti-inflammatories. His pain is significantly interfering with his daily living activities. It is appropriate to order flexion-extension radiographs of the lumbar spine to rule out any instability as he does have spondylolisthesis grade 2. We thoroughly reviewed his radiographic images and spinal listhesis as well as disc degeneration was discussed with the patient. I did have a discussion regarding the benefits of weight loss for spinal health as well as decreased alcohol intake and he expressed an understanding and will take this under advisement. I will start him on a course of physical therapy for stabilization and strengthening, prescription was provided. I will follow-up with the patient, review the results and current symptoms after physical therapy, and discuss the next steps of the treatment plan may include further imaging. My impressions and treatment recommendations were discussed in detail with the patient who verbalized understanding and had no further questions. Discharge instructions were provided. I personally saw and examined the patient and I agree with the above discussed plan of care. This note is created using dictation transcription. It may contain typographical errors, grammatical errors, improperly dictated words, background noise and other errors. Orders Placed This Encounter   Procedures    XR lumbar sp/bending only min 2-3 v     Standing Status:   Future     Standing Expiration Date:   12/12/2027     Scheduling Instructions:      Bring along any outside films relating to this procedure.           Ambulatory referral to Physical Therapy     Standing Status:   Future     Standing Expiration Date:   12/12/2024     Referral Priority: Routine     Referral Type:   Physical Therapy     Referral Reason:   Specialty Services Required     Requested Specialty:   Physical Therapy     Number of Visits Requested:   1     Expiration Date:   12/12/2024     New Medications Ordered This Visit   Medications    Armodafinil 250 MG tablet     Sig: Take 250 mg by mouth daily     Referred By: Cece Brambila DO  History of Present Illness    Wolf Higginbotham is a 25 y.o. male with longstanding history of low back and lower extremity pain. 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 His pain is moderate to severe rates it 4-5 out of 10 the visual analog scale significant impairing with his daily living activities. His pain is nearly constant could be shooting with a numbing sensation he has subjective weakness of the lower limbs denies any loss of bowel bladder function. Lying down decreases symptoms while standing and bending aggravate his pain. His history is positive for multiple knee surgeries rotator cuff surgery as well as positive for tobacco, marijuana and alcohol. I have personally reviewed and/or updated the patient's past medical history, past surgical history, family history, social history, current medications, allergies, and vital signs today. Review of Systems   Constitutional:  Positive for unexpected weight change. Negative for fever. HENT:  Negative for trouble swallowing. Eyes:  Negative for visual disturbance. Respiratory:  Negative for shortness of breath and wheezing. Cardiovascular:  Negative for chest pain and palpitations. Gastrointestinal:  Negative for constipation, diarrhea, nausea and vomiting. Endocrine: Negative for cold intolerance, heat intolerance and polydipsia. Genitourinary:  Negative for difficulty urinating and frequency. Musculoskeletal:  Positive for arthralgias, joint swelling and myalgias.  Negative for gait problem. Skin:  Negative for rash. Neurological:  Negative for dizziness, seizures, syncope, weakness and headaches. Hematological:  Does not bruise/bleed easily. Psychiatric/Behavioral:  Negative for dysphoric mood. All other systems reviewed and are negative.       Patient Active Problem List   Diagnosis    Complete tear of MCL of knee, right, initial encounter    Depression    Obesity, Class II, BMI 35-39.9    Gastroesophageal reflux disease    Closed nondisplaced fracture of third metatarsal bone of right foot, initial encounter    Acute alcoholic intoxication with complication (HCC)    Suicidal ideation    Status post labral repair of shoulder    Injury of right shoulder       Past Medical History:   Diagnosis Date    Anxiety     Gastric ulcer     GERD (gastroesophageal reflux disease)     Hypertension     Psychiatric disorder     Seizure ([address])     Stomach ulcer        Past Surgical History:   Procedure Laterality Date    KNEE SURGERY Right 2016 & 2018    ACL    KNEE SURGERY Left 2017    ACL    SHOULDER SURGERY Right 2016    posterior labrum and rotator cuff repair       Family History   Problem Relation Age of Onset    No Known Problems Mother     Heart disease Father     Hypertension Father        Social History     Occupational History    Not on file   Tobacco Use    Smoking status: Every Day     Packs/day: 0.50     Types: Cigarettes    Smokeless tobacco: Current     Types: Chew   Vaping Use    Vaping Use: Never used   Substance and Sexual Activity    Alcohol use: Yes     Comment: 4 beers and 2 mixed drinks    Drug use: Yes     Types: Marijuana     Comment: occassionally    Sexual activity: Not Currently       Current Outpatient Medications on File Prior to Visit   Medication Sig    Armodafinil 250 MG tablet Take 250 mg by mouth daily    gabapentin (NEURONTIN) 800 mg tablet Take 800 mg by mouth 3 (three) times a day    omeprazole (PriLOSEC) 40 MG capsule Take 1 tab twice daily for 1 month followed by daily till next endoscopy. Please take before meals. Potassium (POTASSIMIN PO) Take 20 mg by mouth    modafinil (PROVIGIL) 100 mg tablet Take 100 mg by mouth daily    [DISCONTINUED] dicyclomine (BENTYL) 20 mg tablet Take 1 tablet (20 mg total) by mouth 2 (two) times a day (Patient not taking: Reported on 11/30/2022)    [DISCONTINUED] ondansetron (Zofran ODT) 4 mg disintegrating tablet Take 1 tablet (4 mg total) by mouth every 6 (six) hours as needed for nausea or vomiting (Patient not taking: Reported on 11/30/2022)    [DISCONTINUED] ondansetron (ZOFRAN-ODT) 4 mg disintegrating tablet Take 1 tablet (4 mg total) by mouth every 6 (six) hours as needed for nausea or vomiting (Patient not taking: Reported on 11/8/2022)    [DISCONTINUED] sertraline (ZOLOFT) 100 mg tablet Take 100 mg by mouth daily (Patient not taking: Reported on 11/8/2022)    [DISCONTINUED] sucralfate (CARAFATE) 1 g/10 mL suspension Take 10 mL (1 g total) by mouth 4 (four) times a day (with meals and at bedtime) (Patient not taking: Reported on 11/30/2023)     No current facility-administered medications on file prior to visit. Allergies   Allergen Reactions    Pine      Other reaction(s): Dermatologic Reaction       Physical Exam    /84 (BP Location: Left arm, Patient Position: Sitting, Cuff Size: Standard)   Pulse (!) 127   Temp 99 °F (37.2 °C)   Ht 5' 10" (1.778 m)   Wt 126 kg (277 lb)   BMI 39.75 kg/m²     Constitutional: normal, well developed, well nourished, alert, in no distress and non-toxic and no overt pain behavior.  and obese  Eyes: anicteric  HEENT: grossly intact  Neck: supple, symmetric, trachea midline and no masses   Pulmonary:even and unlabored  Cardiovascular:No edema or pitting edema present  Skin:Normal without rashes or lesions and well hydrated  Psychiatric:Mood and affect appropriate  Neurologic:Cranial Nerves II-XII grossly intact  Musculoskeletal:normal, difficulty going from sitting to standing sitting position; no obvious skin lesions or erythema lumbar sacral spine; mild tenderness in lumbar paravertebrals, no sacroiliac or greater trochanteric tenderness bilateral; deep tendon reflexes are diminished but symmetrical bilateral patellar and achilles; no focal motor deficit appreciated lower limbs; negative bilateral straight leg raising. Imaging  LUMBAR SPINE @  11-13-23     INDICATION:   M54.50: Low back pain, unspecified. COMPARISON:  None     VIEWS:  XR SPINE LUMBAR 2 OR 3 VIEWS INJURY        FINDINGS:     There are 5 non rib bearing lumbar vertebral bodies. There is no evidence of acute fracture or destructive osseous lesion. Grade 2 spondylolisthesis L5-S1 has progressed since the prior CT scan potentially secondary to upright technique. Loss of disc at L5-S1     The pedicles appear intact. Soft tissues are unremarkable. IMPRESSION:     Grade 2 spondylolisthesis L5-S1 has progressed since the prior CT of November 8, 2022. This may be related to upright positioning. No acute fracture identified. I have personally reviewed pertinent films in PACS and my interpretation is mild listhesis at L5-S1 with disc degeneration.

## 2024-01-11 ENCOUNTER — APPOINTMENT (OUTPATIENT)
Dept: RADIOLOGY | Facility: CLINIC | Age: 29
End: 2024-01-11
Payer: COMMERCIAL

## 2024-01-11 DIAGNOSIS — M47.816 LUMBAR SPONDYLOSIS: ICD-10-CM

## 2024-01-11 PROCEDURE — 72120 X-RAY BEND ONLY L-S SPINE: CPT

## 2024-01-12 ENCOUNTER — OFFICE VISIT (OUTPATIENT)
Dept: PAIN MEDICINE | Facility: CLINIC | Age: 29
End: 2024-01-12
Payer: COMMERCIAL

## 2024-01-12 VITALS
SYSTOLIC BLOOD PRESSURE: 158 MMHG | DIASTOLIC BLOOD PRESSURE: 96 MMHG | WEIGHT: 278 LBS | BODY MASS INDEX: 39.8 KG/M2 | HEART RATE: 105 BPM | HEIGHT: 70 IN | TEMPERATURE: 98 F

## 2024-01-12 DIAGNOSIS — M43.17 SPONDYLOLISTHESIS AT L5-S1 LEVEL: ICD-10-CM

## 2024-01-12 DIAGNOSIS — M54.16 LUMBAR RADICULOPATHY: Primary | ICD-10-CM

## 2024-01-12 DIAGNOSIS — M47.816 LUMBAR SPONDYLOSIS: ICD-10-CM

## 2024-01-12 PROCEDURE — 99214 OFFICE O/P EST MOD 30 MIN: CPT | Performed by: PHYSICIAN ASSISTANT

## 2024-01-12 NOTE — PROGRESS NOTES
Assessment:  1. Lumbar radiculopathy    2. Lumbar spondylosis    3. Spondylolisthesis at L5-S1 level        Plan:  While the patient was in the office today, I did have a thorough conversation regarding their chronic pain syndrome, medication management, and treatment plan options.    Unfortunately, we do not have the results of his x-ray that he completed yesterday.  I told him that we will contact him with the results.    We spent time discussing physical therapy and chiropractic therapy.  He states that he does not have time with his busy work schedule to attend physical therapy and there hours are not accommodating.  I have provided him with a referral for chiropractic care to see if this is effective.  If he fails to respond but then an MRI of the lumbar spine may be indicated.    The patient was advised to contact the office should their symptoms worsen in the interim. The patient was agreeable and verbalized an understanding.        History of Present Illness:    The patient is a 28 y.o. male last seen on 12/12/2023 who presents for a follow up office visit in regards to chronic low back pain.  The patient currently reports chronic low back pain that he presently rates a 7 out of 10 and describes it as a constant pressure-like pain that shoots down the buttocks and posterior aspects of bilateral lower extremities.  It is made worse with prolonged standing, walking and bending.  This pain has been ongoing for years but states that became significantly worse after a metatarsal fracture.  He does continue to follow-up with podiatry regarding ongoing pain in the foot.  His last visit was his initial consultation at which point an x-ray and physical therapy were ordered.  Patient states that physical therapy did not have hours accommodating to his work schedule and he just went for the x-ray yesterday.    I have personally reviewed and/or updated the patient's past medical history, past surgical history, family  history, social history, current medications, allergies, and vital signs today.       Review of Systems:    Review of Systems   Respiratory:  Negative for shortness of breath.    Cardiovascular:  Negative for chest pain.   Gastrointestinal:  Negative for constipation, diarrhea, nausea and vomiting.   Musculoskeletal:  Positive for gait problem. Negative for arthralgias, joint swelling (Joint stiffness) and myalgias.   Skin:  Negative for rash.   Neurological:  Negative for dizziness, seizures and weakness.   All other systems reviewed and are negative.        Past Medical History:   Diagnosis Date   • Anxiety    • Gastric ulcer    • GERD (gastroesophageal reflux disease)    • Hypertension    • Psychiatric disorder    • Seizure (HCC)    • Stomach ulcer        Past Surgical History:   Procedure Laterality Date   • KNEE SURGERY Right 2016 & 2018    ACL   • KNEE SURGERY Left 2017    ACL   • SHOULDER SURGERY Right 2016    posterior labrum and rotator cuff repair       Family History   Problem Relation Age of Onset   • No Known Problems Mother    • Heart disease Father    • Hypertension Father        Social History     Occupational History   • Not on file   Tobacco Use   • Smoking status: Every Day     Current packs/day: 0.50     Types: Cigarettes   • Smokeless tobacco: Current     Types: Chew   Vaping Use   • Vaping status: Never Used   Substance and Sexual Activity   • Alcohol use: Yes     Comment: 4 beers and 2 mixed drinks   • Drug use: Yes     Types: Marijuana     Comment: occassionally   • Sexual activity: Not Currently         Current Outpatient Medications:   •  Armodafinil 250 MG tablet, Take 250 mg by mouth daily, Disp: , Rfl:   •  gabapentin (NEURONTIN) 800 mg tablet, Take 800 mg by mouth 3 (three) times a day, Disp: , Rfl: 0  •  omeprazole (PriLOSEC) 40 MG capsule, Take 1 tab twice daily for 1 month followed by daily till next endoscopy. Please take before meals., Disp: 180 capsule, Rfl: 0  •  Potassium  "(POTASSIMIN PO), Take 20 mg by mouth, Disp: , Rfl:   •  modafinil (PROVIGIL) 100 mg tablet, Take 100 mg by mouth daily (Patient not taking: Reported on 1/12/2024), Disp: , Rfl:     Allergies   Allergen Reactions   • Pine      Other reaction(s): Dermatologic Reaction       Physical Exam:    /96 (BP Location: Left arm, Patient Position: Sitting, Cuff Size: Large)   Pulse 105   Temp 98 °F (36.7 °C)   Ht 5' 10\" (1.778 m)   Wt 126 kg (278 lb)   BMI 39.89 kg/m²     Constitutional:normal, well developed, well nourished, alert, in no distress and non-toxic and no overt pain behavior. and obese  Eyes:anicteric  HEENT:grossly intact  Neck:supple, symmetric, trachea midline and no masses   Pulmonary:even and unlabored  Cardiovascular:No edema or pitting edema present  Skin:Normal without rashes or lesions and well hydrated  Psychiatric:Mood and affect appropriate  Neurologic:Cranial Nerves II-XII grossly intact  Musculoskeletal:normal      Imaging            "

## 2024-01-16 ENCOUNTER — OFFICE VISIT (OUTPATIENT)
Dept: PODIATRY | Facility: CLINIC | Age: 29
End: 2024-01-16
Payer: COMMERCIAL

## 2024-01-16 ENCOUNTER — APPOINTMENT (OUTPATIENT)
Dept: RADIOLOGY | Facility: CLINIC | Age: 29
End: 2024-01-16
Payer: COMMERCIAL

## 2024-01-16 VITALS
BODY MASS INDEX: 39.8 KG/M2 | HEIGHT: 70 IN | WEIGHT: 278 LBS | SYSTOLIC BLOOD PRESSURE: 145 MMHG | DIASTOLIC BLOOD PRESSURE: 90 MMHG

## 2024-01-16 DIAGNOSIS — S92.334G: ICD-10-CM

## 2024-01-16 DIAGNOSIS — S92.334G: Primary | ICD-10-CM

## 2024-01-16 DIAGNOSIS — M79.671 PAIN OF RIGHT FOOT: ICD-10-CM

## 2024-01-16 PROCEDURE — 73630 X-RAY EXAM OF FOOT: CPT

## 2024-01-16 PROCEDURE — 99213 OFFICE O/P EST LOW 20 MIN: CPT | Performed by: PODIATRIST

## 2024-01-16 RX ORDER — TRAMADOL HYDROCHLORIDE 50 MG/1
50 TABLET ORAL 2 TIMES DAILY PRN
COMMUNITY
Start: 2024-01-02

## 2024-01-16 RX ORDER — CELECOXIB 400 MG/1
400 CAPSULE ORAL DAILY
COMMUNITY
Start: 2023-11-01

## 2024-01-16 RX ORDER — CYCLOBENZAPRINE HCL 10 MG
10 TABLET ORAL
COMMUNITY
Start: 2024-01-02

## 2024-01-16 NOTE — PROGRESS NOTES
Assessment/Plan:   Rx X-ray right foot: Bone healing evident on x-rays, bone callus evident though fracture is still noted.  Overall with good progress noted and much less pain and discomfort with reduced swelling it is recommended at this point that he return back to his sneaker and may discontinue the cam boot.  Gradual increased activity recommended with no restrictions.  Follow-up as needed.     Diagnoses and all orders for this visit:    Closed nondisplaced fracture of third metatarsal bone of right foot with delayed healing    Pain of right foot    Other orders  -     traMADol (ULTRAM) 50 mg tablet; Take 50 mg by mouth 2 (two) times a day as needed  -     cyclobenzaprine (FLEXERIL) 10 mg tablet; Take 10 mg by mouth daily at bedtime  -     celecoxib (CeleBREX) 400 MG capsule; Take 400 mg by mouth daily          Subjective:     Patient ID: Vin Hernandez is a 28 y.o. male.    1/16/2024: 28-year-old male seen today for follow-up fracture care involving right foot third metatarsal.  Patient reports swelling has diminished and he is getting around better.    11/30/2023: 28-year-old male presents with fracture right foot for evaluation and continued care.  Reports back in June and injured his right foot and never had it x-rayed but the foot failed to progress.  Patient saw his primary care in July and thought it was just a sprain, since that point he had consistent pain daily and most recently saw his primary care again who ordered an x-ray which was approximately 2 weeks ago and he was diagnosed with a fracture right third metatarsal.  Is any new pain/discomfort.        Review of Systems   Constitutional: Negative.    HENT: Negative.     Eyes: Negative.    Respiratory: Negative.     Cardiovascular: Negative.    Gastrointestinal: Negative.    Endocrine: Negative.    Genitourinary: Negative.    Musculoskeletal:         Diminished swelling and minimal pain right midfoot   Skin: Negative.    Allergic/Immunologic:  Negative.    Neurological: Negative.    Hematological: Negative.    Psychiatric/Behavioral: Negative.           Objective:     Physical Exam  Constitutional:       Appearance: Normal appearance.   HENT:      Head: Normocephalic.      Right Ear: Tympanic membrane normal.      Left Ear: Tympanic membrane normal.      Nose: No congestion.      Mouth/Throat:      Pharynx: No oropharyngeal exudate or posterior oropharyngeal erythema.   Eyes:      Conjunctiva/sclera: Conjunctivae normal.      Pupils: Pupils are equal, round, and reactive to light.   Cardiovascular:      Rate and Rhythm: Normal rate and regular rhythm.      Pulses:           Dorsalis pedis pulses are 1+ on the right side and 1+ on the left side.        Posterior tibial pulses are 1+ on the right side and 1+ on the left side.   Pulmonary:      Effort: Pulmonary effort is normal.   Musculoskeletal:         General: Swelling and tenderness present.      Comments: Minimal edema remains right midfoot, mild tenderness/pain with palpation right third metatarsal proximal one third.  No pain with subtalar joint or ankle joint range of motion.  Muscle strength +5/5.  Clinically stable fracture site.   Feet:      Right foot:      Protective Sensation: 10 sites tested.  10 sites sensed.      Left foot:      Protective Sensation: 10 sites tested.  10 sites sensed.   Skin:     General: Skin is warm and dry.      Capillary Refill: Capillary refill takes 2 to 3 seconds.      Coloration: Skin is not pale.      Findings: No bruising, erythema, lesion or rash.   Neurological:      General: No focal deficit present.      Mental Status: He is alert.      Cranial Nerves: No cranial nerve deficit.      Sensory: No sensory deficit.      Motor: No weakness.      Gait: Gait normal.      Deep Tendon Reflexes: Reflexes normal.   Psychiatric:         Mood and Affect: Mood normal.         Behavior: Behavior normal.         Judgment: Judgment normal.

## 2024-01-23 ENCOUNTER — TELEPHONE (OUTPATIENT)
Dept: PAIN MEDICINE | Facility: CLINIC | Age: 29
End: 2024-01-23

## 2024-01-23 NOTE — TELEPHONE ENCOUNTER
LVM to rescheduled ov with Katherine in [facility]     Provider out of office    1/26 appt cancelled

## 2024-02-02 ENCOUNTER — OFFICE VISIT (OUTPATIENT)
Dept: PAIN MEDICINE | Facility: CLINIC | Age: 29
End: 2024-02-02
Payer: COMMERCIAL

## 2024-02-02 VITALS
WEIGHT: 276 LBS | HEIGHT: 70 IN | HEART RATE: 102 BPM | DIASTOLIC BLOOD PRESSURE: 80 MMHG | SYSTOLIC BLOOD PRESSURE: 144 MMHG | BODY MASS INDEX: 39.51 KG/M2 | TEMPERATURE: 97.9 F

## 2024-02-02 DIAGNOSIS — M54.16 LUMBAR RADICULOPATHY: ICD-10-CM

## 2024-02-02 DIAGNOSIS — M43.17 SPONDYLOLISTHESIS AT L5-S1 LEVEL: Primary | ICD-10-CM

## 2024-02-02 PROCEDURE — 99214 OFFICE O/P EST MOD 30 MIN: CPT | Performed by: PHYSICIAN ASSISTANT

## 2024-02-02 NOTE — PROGRESS NOTES
Assessment:  1. Spondylolisthesis at L5-S1 level    2. Lumbar radiculopathy        Plan:  While the patient was in the office today, I did have a thorough conversation regarding their chronic pain syndrome, medication management, and treatment plan options.    After discussing options, I feel it is medically reasonable and necessary to obtain an MRI of the lumbar spine; his recent flexion-extension x-rays reveal significant anterior spondylolisthesis of L5 on S1 of 50%.  He started chiropractic therapy and is not able to notice any improvement and in fact feels more pain.    Once we obtain the results from the MRI we will contact him to discuss the next step in his treatment plan.    The patient was advised to contact the office should their symptoms worsen in the interim. The patient was agreeable and verbalized an understanding.        History of Present Illness:    The patient is a 28 y.o. male last seen on 1/12/2024 who presents for a follow up office visit in regards to chronic low back pain.  The patient currently reports increasing low back pain with referred pain into the left hip and left lower extremity as well as the hip region.  Patient underwent flexion-extension lumbar x-rays which revealed significant spondylolisthesis of L5 on S1 of nearly 50%.  He is attending chiropractic therapy which unfortunately is not helping and in fact the patient feels that his pain is worse since he last saw us.    I have personally reviewed and/or updated the patient's past medical history, past surgical history, family history, social history, current medications, allergies, and vital signs today.       Review of Systems:    Review of Systems   Respiratory:  Negative for shortness of breath.    Cardiovascular:  Negative for chest pain.   Gastrointestinal:  Negative for constipation, diarrhea, nausea and vomiting.   Musculoskeletal:  Positive for gait problem. Negative for arthralgias, joint swelling and myalgias.   Skin:   Negative for rash.   Neurological:  Negative for dizziness, seizures and weakness.   All other systems reviewed and are negative.        Past Medical History:   Diagnosis Date   • Anxiety    • Gastric ulcer    • GERD (gastroesophageal reflux disease)    • Hypertension    • Psychiatric disorder    • Seizure (HCC)    • Stomach ulcer        Past Surgical History:   Procedure Laterality Date   • KNEE SURGERY Right 2016 & 2018    ACL   • KNEE SURGERY Left 2017    ACL   • SHOULDER SURGERY Right 2016    posterior labrum and rotator cuff repair       Family History   Problem Relation Age of Onset   • No Known Problems Mother    • Heart disease Father    • Hypertension Father        Social History     Occupational History   • Not on file   Tobacco Use   • Smoking status: Every Day     Current packs/day: 0.50     Types: Cigarettes   • Smokeless tobacco: Current     Types: Chew   Vaping Use   • Vaping status: Never Used   Substance and Sexual Activity   • Alcohol use: Yes     Comment: 4 beers and 2 mixed drinks   • Drug use: Yes     Types: Marijuana     Comment: occassionally   • Sexual activity: Not Currently         Current Outpatient Medications:   •  Armodafinil 250 MG tablet, Take 250 mg by mouth daily, Disp: , Rfl:   •  celecoxib (CeleBREX) 400 MG capsule, Take 400 mg by mouth daily, Disp: , Rfl:   •  cyclobenzaprine (FLEXERIL) 10 mg tablet, Take 10 mg by mouth daily at bedtime, Disp: , Rfl:   •  gabapentin (NEURONTIN) 800 mg tablet, Take 800 mg by mouth 3 (three) times a day, Disp: , Rfl: 0  •  omeprazole (PriLOSEC) 40 MG capsule, Take 1 tab twice daily for 1 month followed by daily till next endoscopy. Please take before meals., Disp: 180 capsule, Rfl: 0  •  Potassium (POTASSIMIN PO), Take 20 mg by mouth, Disp: , Rfl:   •  traMADol (ULTRAM) 50 mg tablet, Take 50 mg by mouth 2 (two) times a day as needed, Disp: , Rfl:   •  modafinil (PROVIGIL) 100 mg tablet, Take 100 mg by mouth daily (Patient not taking: Reported on  "1/12/2024), Disp: , Rfl:     Allergies   Allergen Reactions   • Pine      Other reaction(s): Dermatologic Reaction       Physical Exam:    /80 (BP Location: Left arm, Patient Position: Sitting, Cuff Size: Large)   Pulse 102   Temp 97.9 °F (36.6 °C)   Ht 5' 10\" (1.778 m)   Wt 125 kg (276 lb)   BMI 39.60 kg/m²     Constitutional:normal, well developed, well nourished, alert, in no distress and non-toxic and no overt pain behavior.  Eyes:anicteric  HEENT:grossly intact  Neck:supple, symmetric, trachea midline and no masses   Pulmonary:even and unlabored  Cardiovascular:No edema or pitting edema present  Skin:Normal without rashes or lesions and well hydrated  Psychiatric:Mood and affect appropriate  Neurologic:Cranial Nerves II-XII grossly intact  Musculoskeletal: Painful range of motion of the lumbar spine, positive straight leg raise test left side, gait is slightly antalgic.      Imaging      XRAY LUMBAR SPINE 1/11/2024 @ [facility]     INDICATION:   Spondylosis without myelopathy or radiculopathy, lumbar region.      COMPARISON:  Comparison made with previous examination(s) dated (DX) 13-Nov-2023,(DX) 08-Nov-2022,(CT) 08-Nov-2022,(CT) 19-Mar-2021.     VIEWS:  XR LUMBAR SP/BENDING ONLY MIN 2-3 V  Images: 6     FINDINGS:     There are 5 non rib bearing lumbar vertebral bodies.      There is no evidence of acute fracture or destructive osseous lesion.     There is anterior spondylolisthesis of L5 on S1 of nearly 50% with abnormal motion on flexion and extension showing translation of nearly 7 mm.     No significant lumbar degenerative change noted.     The pedicles appear intact.     Soft tissues are unremarkable.     IMPRESSION:        Significant anterior spondylolisthesis of L5 on S1 with 7 mm of abnormal translation.        "

## 2025-03-27 ENCOUNTER — APPOINTMENT (EMERGENCY)
Dept: RADIOLOGY | Facility: HOSPITAL | Age: 30
End: 2025-03-27
Payer: OTHER MISCELLANEOUS

## 2025-03-27 ENCOUNTER — HOSPITAL ENCOUNTER (EMERGENCY)
Facility: HOSPITAL | Age: 30
Discharge: HOME | End: 2025-03-27
Attending: EMERGENCY MEDICINE
Payer: OTHER MISCELLANEOUS

## 2025-03-27 VITALS
HEIGHT: 69 IN | WEIGHT: 270 LBS | SYSTOLIC BLOOD PRESSURE: 183 MMHG | DIASTOLIC BLOOD PRESSURE: 117 MMHG | RESPIRATION RATE: 18 BRPM | TEMPERATURE: 98.9 F | HEART RATE: 112 BPM | OXYGEN SATURATION: 96 % | BODY MASS INDEX: 39.99 KG/M2

## 2025-03-27 DIAGNOSIS — M43.10 SPONDYLOLISTHESIS: ICD-10-CM

## 2025-03-27 DIAGNOSIS — M54.50 LOW BACK PAIN: Primary | ICD-10-CM

## 2025-03-27 PROCEDURE — 99283 EMERGENCY DEPT VISIT LOW MDM: CPT

## 2025-03-27 PROCEDURE — 99284 EMERGENCY DEPT VISIT MOD MDM: CPT | Performed by: EMERGENCY MEDICINE

## 2025-03-27 PROCEDURE — 72100 X-RAY EXAM L-S SPINE 2/3 VWS: CPT

## 2025-03-27 RX ORDER — PREDNISONE 10 MG/1
TABLET ORAL
Qty: 30 TABLET | Refills: 0 | Status: SHIPPED | OUTPATIENT
Start: 2025-03-28

## 2025-03-27 RX ORDER — LIDOCAINE 50 MG/G
2 PATCH TOPICAL ONCE
Status: DISCONTINUED | OUTPATIENT
Start: 2025-03-27 | End: 2025-03-27 | Stop reason: HOSPADM

## 2025-03-27 RX ORDER — OXYCODONE AND ACETAMINOPHEN 5; 325 MG/1; MG/1
2 TABLET ORAL ONCE
Refills: 0 | Status: COMPLETED | OUTPATIENT
Start: 2025-03-27 | End: 2025-03-27

## 2025-03-27 RX ADMIN — OXYCODONE HYDROCHLORIDE AND ACETAMINOPHEN 2 TABLET: 5; 325 TABLET ORAL at 13:48

## 2025-03-27 RX ADMIN — LIDOCAINE 5% 2 PATCH: 700 PATCH TOPICAL at 13:49

## 2025-03-27 RX ADMIN — PREDNISONE 50 MG: 20 TABLET ORAL at 13:48

## 2025-03-27 NOTE — Clinical Note
Vin Hernandez was seen and treated in our emergency department on 3/27/2025.                Diagnosis:     Vin  .    He may return on this date:     No heavy listing until cleared by orthopedics     If you have any questions or concerns, please don't hesitate to call.      John Soriano, DO    ______________________________           _______________          _______________  Hospital Representative                              Date                                Time

## 2025-03-27 NOTE — Clinical Note
Vin Hernandez was seen and treated in our emergency department on 3/27/2025.                Diagnosis:     Vin  may return to work on return date.    He may return on this date: 04/01/2025         If you have any questions or concerns, please don't hesitate to call.      John Soriano, DO    ______________________________           _______________          _______________  Hospital Representative                              Date                                Time

## 2025-03-27 NOTE — ED PROVIDER NOTES
"Time reflects when diagnosis was documented in both MDM as applicable and the Disposition within this note       Time User Action Codes Description Comment    3/27/2025  2:30 PM John Soriano [M54.50] Low back pain     3/27/2025  2:58 PM John Soriano [M43.10] Spondylolisthesis           ED Disposition       ED Disposition   Discharge    Condition   Stable    Date/Time   Thu Mar 27, 2025  2:58 PM    Comment   Vin Hernandez discharge to home/self care.                   Assessment & Plan       Medical Decision Making  Low back pain without any red flags will check basic x-rays treat symptomatically    Amount and/or Complexity of Data Reviewed  Radiology: ordered and independent interpretation performed.    Risk  Prescription drug management.             Medications   lidocaine (LIDODERM) 5 % patch 2 patch (2 patches Topical Medication Applied 3/27/25 1349)   oxyCODONE-acetaminophen (PERCOCET) 5-325 mg per tablet 2 tablet (2 tablets Oral Given 3/27/25 1348)   predniSONE tablet 50 mg (50 mg Oral Given 3/27/25 1348)       ED Risk Strat Scores                                                History of Present Illness       Chief Complaint   Patient presents with    Back Pain     Pt states that this am he began having pain to his lower back while he was at work. Pt states  \"it feels like someone is squeezing the bottom of my spine\". Pt states that the pain started when he went to  a box.        Past Medical History:   Diagnosis Date    Anxiety     Gastric ulcer     GERD (gastroesophageal reflux disease)     Hypertension     Psychiatric disorder     Seizure (HCC)     Stomach ulcer       Past Surgical History:   Procedure Laterality Date    KNEE SURGERY Right 2016 & 2018    ACL    KNEE SURGERY Left 2017    ACL    SHOULDER SURGERY Right 2016    posterior labrum and rotator cuff repair      Family History   Problem Relation Age of Onset    No Known Problems Mother     Heart disease Father     Hypertension " Father       Social History     Tobacco Use    Smoking status: Every Day     Current packs/day: 0.50     Types: Cigarettes    Smokeless tobacco: Current     Types: Chew   Vaping Use    Vaping status: Never Used   Substance Use Topics    Alcohol use: Yes     Comment: 4 beers and 2 mixed drinks    Drug use: Yes     Types: Marijuana     Comment: occassionally      E-Cigarette/Vaping    E-Cigarette Use Never User       E-Cigarette/Vaping Substances    Nicotine No     THC No     CBD No     Flavoring No     Other No     Unknown No       I have reviewed and agree with the history as documented.     29-year-old male who states he was bending over to  an object at work earlier this morning felt a pop and now has pain in the low back he has chronic issues with pain and paresthesias in the legs which seem worse today he is not incontinent of urine or stool no saddle anesthesia he is ambulatory with a limp no fevers chills no history of.  Patient is currently on tramadol muscle relaxants and gabapentin.  Was referred to pain management in the past but never had injections      History provided by:  Patient  Medical Problem  Location:  Low back  Quality:  Sharp pain  Severity:  Severe  Onset quality:  Sudden  Duration:  5 hours  Timing:  Constant  Progression:  Waxing and waning  Chronicity:  New  Context:  Low back pain  Worsened by:  Movement  Associated symptoms: no abdominal pain and no fever        Review of Systems   Constitutional:  Negative for fever.   Gastrointestinal:  Negative for abdominal pain.   Musculoskeletal:  Positive for back pain.   All other systems reviewed and are negative.          Objective       ED Triage Vitals   Temperature Pulse Blood Pressure Respirations SpO2 Patient Position - Orthostatic VS   03/27/25 1036 03/27/25 1037 03/27/25 1037 03/27/25 1036 03/27/25 1036 03/27/25 1037   98.9 °F (37.2 °C) (!) 119 (!) 188/86 18 97 % Sitting      Temp Source Heart Rate Source BP Location FiO2 (%) Pain  Score    03/27/25 1036 03/27/25 1036 03/27/25 1037 -- 03/27/25 1036    Temporal Monitor Left arm  7      Vitals      Date and Time Temp Pulse SpO2 Resp BP Pain Score FACES Pain Rating User   03/27/25 1504 -- 112 96 % 18 183/117 -- -- RN   03/27/25 1352 -- 110 97 % 16 172/105 -- -- RN   03/27/25 1350 -- -- -- -- -- 9 -- RN   03/27/25 1348 -- -- -- -- -- 9 -- RN   03/27/25 1037 -- 119 -- -- 188/86 -- -- RN   03/27/25 1036 98.9 °F (37.2 °C) -- 97 % 18 -- 7 -- RN            Physical Exam  Vitals and nursing note reviewed.   Constitutional:       General: He is not in acute distress.     Appearance: He is not toxic-appearing.   HENT:      Head: Normocephalic and atraumatic.      Right Ear: External ear normal.      Left Ear: External ear normal.   Eyes:      Extraocular Movements: Extraocular movements intact.      Pupils: Pupils are equal, round, and reactive to light.   Cardiovascular:      Rate and Rhythm: Regular rhythm. Tachycardia present.      Pulses: Normal pulses.      Heart sounds: No murmur heard.     No gallop.   Pulmonary:      Effort: No respiratory distress.      Breath sounds: No stridor. No wheezing, rhonchi or rales.   Abdominal:      General: There is no distension.      Palpations: Abdomen is soft.      Tenderness: There is no abdominal tenderness.   Musculoskeletal:         General: Tenderness present.      Cervical back: Normal range of motion and neck supple. No tenderness.      Right lower leg: No edema.      Left lower leg: No edema.      Comments: Bilateral lower lumbar paraspinal spasm and tenderness   Skin:     Coloration: Skin is not jaundiced.      Findings: No bruising, erythema, lesion or rash.   Neurological:      Mental Status: He is alert and oriented to person, place, and time.      Cranial Nerves: No cranial nerve deficit.      Sensory: Sensory deficit present.      Motor: No weakness.      Coordination: Coordination normal.      Deep Tendon Reflexes: Reflexes normal.      Comments:  Some decreased sensation to touch to the anterior lateral right calf   Psychiatric:         Mood and Affect: Mood normal.         Thought Content: Thought content normal.         Results Reviewed       None            XR lumbar spine 2 or 3 views   ED Interpretation by John Soriano DO (03/27 1350)   Spondylolisthesis similar to prior studies      Final Interpretation by Carlos Contreras MD (03/27 6127)      No acute osseous abnormality.   Stable L5 on S1 grade 2 anterolisthesis on the basis of chronic pars defects.      Computerized Assisted Algorithm (CAA) may have been used to analyze all applicable images.         Workstation performed: [id number]             Procedures    ED Medication and Procedure Management   Prior to Admission Medications   Prescriptions Last Dose Informant Patient Reported? Taking?   Armodafinil 250 MG tablet   Yes No   Sig: Take 250 mg by mouth daily   Potassium (POTASSIMIN PO)   Yes No   Sig: Take 20 mg by mouth   celecoxib (CeleBREX) 400 MG capsule   Yes No   Sig: Take 400 mg by mouth daily   cyclobenzaprine (FLEXERIL) 10 mg tablet   Yes No   Sig: Take 10 mg by mouth daily at bedtime   gabapentin (NEURONTIN) 800 mg tablet  Self Yes No   Sig: Take 800 mg by mouth 3 (three) times a day   modafinil (PROVIGIL) 100 mg tablet   Yes No   Sig: Take 100 mg by mouth daily   Patient not taking: Reported on 1/12/2024   omeprazole (PriLOSEC) 40 MG capsule   No No   Sig: Take 1 tab twice daily for 1 month followed by daily till next endoscopy. Please take before meals.   traMADol (ULTRAM) 50 mg tablet   Yes No   Sig: Take 50 mg by mouth 2 (two) times a day as needed      Facility-Administered Medications: None     Discharge Medication List as of 3/27/2025  3:00 PM        START taking these medications    Details   predniSONE 10 mg tablet 5 pills x 2 days, 4 pills x 2 days, 3 pills x 2 days, 2 pills x 2 days, 1 pill x 2 days Do not start before March 28, 2025., Print           CONTINUE these  medications which have NOT CHANGED    Details   Armodafinil 250 MG tablet Take 250 mg by mouth daily, Starting Tue 10/31/2023, Historical Med      celecoxib (CeleBREX) 400 MG capsule Take 400 mg by mouth daily, Starting Wed 11/1/2023, Historical Med      cyclobenzaprine (FLEXERIL) 10 mg tablet Take 10 mg by mouth daily at bedtime, Starting Tue 1/2/2024, Historical Med      gabapentin (NEURONTIN) 800 mg tablet Take 800 mg by mouth 3 (three) times a day, Starting Mon 9/17/2018, Historical Med      modafinil (PROVIGIL) 100 mg tablet Take 100 mg by mouth daily, Historical Med      omeprazole (PriLOSEC) 40 MG capsule Take 1 tab twice daily for 1 month followed by daily till next endoscopy. Please take before meals., Normal      Potassium (POTASSIMIN PO) Take 20 mg by mouth, Historical Med      traMADol (ULTRAM) 50 mg tablet Take 50 mg by mouth 2 (two) times a day as needed, Starting Tue 1/2/2024, Historical Med             ED SEPSIS DOCUMENTATION   Time reflects when diagnosis was documented in both MDM as applicable and the Disposition within this note       Time User Action Codes Description Comment    3/27/2025  2:30 PM John Soriano [M54.50] Low back pain     3/27/2025  2:58 PM John Soriano [M43.10] Spondylolisthesis                  John Soriano DO  03/27/25 [id number]

## 2025-03-28 ENCOUNTER — TELEPHONE (OUTPATIENT)
Dept: PHYSICAL THERAPY | Facility: OTHER | Age: 30
End: 2025-03-28

## 2025-03-28 NOTE — TELEPHONE ENCOUNTER
Call placed to the patient per [facility] referral.    Voice message left for patient to call back. Phone number and hours of business provided.     This is the 1st attempt to reach the patient.  Will defer per protocol.    NOTE: Pt is established with PM. Last seen 2/2/2024. Has also seen chiro    NEW Injury? Work Injury?